# Patient Record
Sex: MALE | Race: WHITE | Employment: FULL TIME | URBAN - METROPOLITAN AREA
[De-identification: names, ages, dates, MRNs, and addresses within clinical notes are randomized per-mention and may not be internally consistent; named-entity substitution may affect disease eponyms.]

---

## 2017-10-23 ENCOUNTER — HOSPITAL ENCOUNTER (OUTPATIENT)
Dept: MRI IMAGING | Age: 72
Discharge: HOME | End: 2017-10-23
Attending: UROLOGY
Payer: MEDICARE

## 2017-10-23 VITALS — WEIGHT: 210 LBS | BODY MASS INDEX: 26.96 KG/M2

## 2017-10-23 DIAGNOSIS — R97.20 ELEVATED PROSTATE SPECIFIC ANTIGEN (PSA): ICD-10-CM

## 2017-10-23 LAB — CREAT BLD-MCNC: 0.8 MG/DL (ref 0.8–1.5)

## 2017-10-23 PROCEDURE — 72197 MRI PELVIS W/O & W/DYE: CPT

## 2017-10-23 PROCEDURE — A9577 INJ MULTIHANCE: HCPCS | Performed by: UROLOGY

## 2017-10-23 PROCEDURE — 74011250636 HC RX REV CODE- 250/636: Performed by: UROLOGY

## 2017-10-23 PROCEDURE — 74011000258 HC RX REV CODE- 258: Performed by: UROLOGY

## 2017-10-23 PROCEDURE — 82565 ASSAY OF CREATININE: CPT

## 2017-10-23 RX ORDER — SODIUM CHLORIDE 0.9 % (FLUSH) 0.9 %
10 SYRINGE (ML) INJECTION
Status: COMPLETED | OUTPATIENT
Start: 2017-10-23 | End: 2017-10-23

## 2017-10-23 RX ADMIN — GADOBENATE DIMEGLUMINE 10 ML: 529 INJECTION, SOLUTION INTRAVENOUS at 08:28

## 2017-10-23 RX ADMIN — SODIUM CHLORIDE 100 ML: 900 INJECTION, SOLUTION INTRAVENOUS at 08:28

## 2017-10-23 RX ADMIN — Medication 10 ML: at 08:28

## 2017-11-27 ENCOUNTER — HOSPITAL ENCOUNTER (OUTPATIENT)
Dept: LAB | Age: 72
Discharge: HOME | End: 2017-11-27

## 2017-11-27 PROCEDURE — 88305 TISSUE EXAM BY PATHOLOGIST: CPT | Performed by: UROLOGY

## 2018-06-12 PROBLEM — C61 PROSTATE CANCER (HCC): Status: ACTIVE | Noted: 2018-06-12

## 2018-07-23 ENCOUNTER — HOSPITAL ENCOUNTER (OUTPATIENT)
Dept: LAB | Age: 73
Discharge: HOME | End: 2018-07-23
Payer: MEDICARE

## 2018-07-23 ENCOUNTER — HOSPITAL ENCOUNTER (OUTPATIENT)
Dept: LAB | Age: 73
Discharge: HOME | End: 2018-07-23

## 2018-07-23 PROCEDURE — 88344 IMHCHEM/IMCYTCHM EA MLT ANTB: CPT

## 2018-07-23 PROCEDURE — 88305 TISSUE EXAM BY PATHOLOGIST: CPT | Performed by: UROLOGY

## 2019-02-27 ENCOUNTER — HOSPITAL ENCOUNTER (OUTPATIENT)
Dept: LAB | Age: 74
Discharge: HOME | End: 2019-02-27

## 2019-02-27 PROCEDURE — 88305 TISSUE EXAM BY PATHOLOGIST: CPT

## 2020-11-18 ENCOUNTER — HOSPITAL ENCOUNTER (OUTPATIENT)
Dept: LAB | Age: 75
Discharge: HOME | End: 2020-11-18

## 2020-11-18 PROCEDURE — 88305 TISSUE EXAM BY PATHOLOGIST: CPT

## 2022-03-20 PROBLEM — C61 PROSTATE CANCER (HCC): Status: ACTIVE | Noted: 2018-06-12

## 2024-01-02 ENCOUNTER — TELEPHONE (OUTPATIENT)
Dept: ORTHOPEDIC SURGERY | Age: 79
End: 2024-01-02

## 2024-01-02 ENCOUNTER — OFFICE VISIT (OUTPATIENT)
Dept: ORTHOPEDIC SURGERY | Age: 79
End: 2024-01-02
Payer: MEDICARE

## 2024-01-02 VITALS — BODY MASS INDEX: 27.34 KG/M2 | HEIGHT: 74 IN | WEIGHT: 213 LBS

## 2024-01-02 DIAGNOSIS — M17.11 PRIMARY OSTEOARTHRITIS OF RIGHT KNEE: Primary | ICD-10-CM

## 2024-01-02 DIAGNOSIS — M25.561 RIGHT KNEE PAIN, UNSPECIFIED CHRONICITY: Primary | ICD-10-CM

## 2024-01-02 DIAGNOSIS — M17.11 PRIMARY OSTEOARTHRITIS OF RIGHT KNEE: ICD-10-CM

## 2024-01-02 PROCEDURE — 99204 OFFICE O/P NEW MOD 45 MIN: CPT | Performed by: ORTHOPAEDIC SURGERY

## 2024-01-02 PROCEDURE — 1123F ACP DISCUSS/DSCN MKR DOCD: CPT | Performed by: ORTHOPAEDIC SURGERY

## 2024-01-02 NOTE — PROGRESS NOTES
Name: Chuy Strickland Jr.  YOB: 1945  Gender: male  MRN: [id number]    CC:   Chief Complaint   Patient presents with    New Patient    Knee Pain     Rt knee pain         HPI: Chuy Strickland Jr. is a 78 y.o. male with a PMHx of HTN and bradycardia  here for evaluation of right knee pain.  The pain has been present for 2 to 3 years and is becoming worse. The pain is primarily on the Medial side.   he describes the pain as dull, aching, and intermittent catching  The pain is worse with going up and/or down stairs, rising after sitting, standing, and walking  The pain does not radiate down the leg.  he denies numbness and tingling down the leg.   Treatment so far has been activity modification, Tylenol, and meloxicam with little relief.      Allergies   Allergen Reactions    Morphine Itching    Oxycodone Other (See Comments)     hallucinations         Review of Systems:  As per HPI.  Pertinent positives and negatives are addressed with the patient, particularly those related to musculoskeletal concerns.   Non-orthopaedic concerns were referred back to the primary care physician.    PHYSICAL EXAMINATION:   The patient appears their stated age and they are in no distress.  Ht 1.88 m (6' 2\")   Wt 96.6 kg (213 lb)   BMI 27.35 kg/m²       The lower extremities are as described below.  Circulation is normal with palpable pedal pulses bilaterally and no edema.  There is no lymph adenopathy in the popliteal or malleolar region.  The skin is without stasis disease distally bilaterally.  Sensation is intact to light touch bilaterally.  There is moderate tenderness to palpation over the medial joint line of the right knee(s)  The gait is noted to be with a slight trendelenburg and antalgia  Range of motion is 0-120 degrees on the right and 0-120 degrees on the left.  right knee: There is 2mm of anterior/posterior translation and 2mm of medial/lateral instability bilaterally.  There is 5 degrees of varus

## 2024-01-03 NOTE — PROGRESS NOTES
[facility]  [address] [address]  Dept: [phone]      Physical Therapy Initial Assessment       Insurance: today is 1/20 visits ()    Total Timed Codes: 35 min, Total Treatment Time: 65 min Modifier needed: No      Referring MD: Shaq Segovia Jr., *  Referral for: Right knee OA/pain  Onset Date: 1/4/2020      Diagnosis:     ICD-10-CM    1. Chronic pain of right knee  M25.561     G89.29       2. Joint stiffness of right lower leg  M25.661       3. Difficulty walking  R26.2       4. Muscle weakness  M62.81       5. Impaired mobility and ADLs  Z74.09     Z78.9              Therapy precautions:None  Co-morbidities affecting plan of care: Bradycardia, HX of prostate CA.      PERTINENT MEDICAL HISTORY     Past medical and surgical history:   Past Medical History:   Diagnosis Date    Bradycardia with 41-50 beats per minute     pt reports sometimes even <40 at rest- seen by cardiologist inpast    Elevated prostate specific antigen (PSA) 11/1/2013    Hypertrophy of prostate with urinary obstruction and other lower urinary tract symptoms (LUTS) 11/1/2013    Personal history of prostate cancer     Unspecified disorder of prostate       Past Surgical History:   Procedure Laterality Date    BIOPSY PROSTATE      ORTHOPEDIC SURGERY Right     scope/ meniscus tear    SHOULDER ARTHROSCOPY Right     TONSILLECTOMY AND ADENOIDECTOMY       Medications: reviewed in chart   Allergies:   Allergies   Allergen Reactions    Morphine Itching    Oxycodone Other (See Comments)     hallucinations          SUBJECTIVE     Chief complaints/history of injury: Patient presents to therapy with right knee pain due to OA. He has had chronic right knee pain for 4 years. He c/o buckling right knee. No falls reported. He has no report of injury right knee. He c/o moderate to severe pain right knee. He states right knee is stiff. He is not using assistive device at this time.         Received previous

## 2024-01-04 ENCOUNTER — TELEPHONE (OUTPATIENT)
Dept: ORTHOPEDIC SURGERY | Age: 79
End: 2024-01-04

## 2024-01-04 ENCOUNTER — EVALUATION (OUTPATIENT)
Age: 79
End: 2024-01-04

## 2024-01-04 DIAGNOSIS — M25.661 JOINT STIFFNESS OF RIGHT LOWER LEG: ICD-10-CM

## 2024-01-04 DIAGNOSIS — G89.29 CHRONIC PAIN OF RIGHT KNEE: Primary | ICD-10-CM

## 2024-01-04 DIAGNOSIS — M25.561 CHRONIC PAIN OF RIGHT KNEE: Primary | ICD-10-CM

## 2024-01-04 DIAGNOSIS — M62.81 MUSCLE WEAKNESS: ICD-10-CM

## 2024-01-04 DIAGNOSIS — Z74.09 IMPAIRED MOBILITY AND ADLS: ICD-10-CM

## 2024-01-04 DIAGNOSIS — Z78.9 IMPAIRED MOBILITY AND ADLS: ICD-10-CM

## 2024-01-04 DIAGNOSIS — R26.2 DIFFICULTY WALKING: ICD-10-CM

## 2024-01-10 DIAGNOSIS — M17.11 PRIMARY OSTEOARTHRITIS OF RIGHT KNEE: ICD-10-CM

## 2024-01-18 ENCOUNTER — PREP FOR PROCEDURE (OUTPATIENT)
Dept: ORTHOPEDIC SURGERY | Age: 79
End: 2024-01-18

## 2024-01-18 DIAGNOSIS — M17.11 PRIMARY OSTEOARTHRITIS OF RIGHT KNEE: Primary | ICD-10-CM

## 2024-01-18 RX ORDER — SODIUM CHLORIDE 0.9 % (FLUSH) 0.9 %
5-40 SYRINGE (ML) INJECTION PRN
Status: CANCELLED | OUTPATIENT
Start: 2024-01-18

## 2024-01-18 RX ORDER — SODIUM CHLORIDE 0.9 % (FLUSH) 0.9 %
5-40 SYRINGE (ML) INJECTION EVERY 12 HOURS SCHEDULED
Status: CANCELLED | OUTPATIENT
Start: 2024-01-18

## 2024-01-18 RX ORDER — SODIUM CHLORIDE 9 MG/ML
INJECTION, SOLUTION INTRAVENOUS PRN
Status: CANCELLED | OUTPATIENT
Start: 2024-01-18

## 2024-01-18 RX ORDER — ACETAMINOPHEN 325 MG/1
1000 TABLET ORAL ONCE
Status: CANCELLED | OUTPATIENT
Start: 2024-01-18 | End: 2024-01-18

## 2024-01-23 ENCOUNTER — HOSPITAL ENCOUNTER (OUTPATIENT)
Dept: REHABILITATION | Age: 79
Discharge: HOME | End: 2024-01-26
Payer: MEDICARE

## 2024-01-23 ENCOUNTER — HOSPITAL ENCOUNTER (OUTPATIENT)
Dept: SURGERY | Age: 79
Discharge: HOME | End: 2024-01-26
Payer: MEDICARE

## 2024-01-23 DIAGNOSIS — M17.11 PRIMARY OSTEOARTHRITIS OF RIGHT KNEE: ICD-10-CM

## 2024-01-23 LAB
ANION GAP SERPL CALC-SCNC: 2 MMOL/L (ref 2–11)
APTT PPP: 25.7 SEC (ref 23.3–37.4)
BASOPHILS # BLD: 0 K/UL (ref 0–0.2)
BASOPHILS NFR BLD: 1 % (ref 0–2)
BUN SERPL-MCNC: 17 MG/DL (ref 8–23)
CALCIUM SERPL-MCNC: 9 MG/DL (ref 8.3–10.4)
CHLORIDE SERPL-SCNC: 108 MMOL/L (ref 103–113)
CO2 SERPL-SCNC: 29 MMOL/L (ref 21–32)
CREAT SERPL-MCNC: 0.91 MG/DL (ref 0.8–1.5)
DIFFERENTIAL METHOD BLD: NORMAL
EKG ATRIAL RATE: 55 BPM
EKG DIAGNOSIS: NORMAL
EKG P AXIS: 40 DEGREES
EKG Q-T INTERVAL: 490 MS
EKG QRS DURATION: 92 MS
EKG QTC CALCULATION (BAZETT): 404 MS
EKG R AXIS: 49 DEGREES
EKG T AXIS: 24 DEGREES
EKG VENTRICULAR RATE: 41 BPM
EOSINOPHIL # BLD: 0.2 K/UL (ref 0–0.8)
EOSINOPHIL NFR BLD: 3 % (ref 0.5–7.8)
ERYTHROCYTE [DISTWIDTH] IN BLOOD BY AUTOMATED COUNT: 12.5 % (ref 11.9–14.6)
EST. AVERAGE GLUCOSE BLD GHB EST-MCNC: 114 MG/DL
GLUCOSE SERPL-MCNC: 126 MG/DL (ref 65–100)
HBA1C MFR BLD: 5.6 % (ref 4.8–5.6)
HCT VFR BLD AUTO: 44.1 % (ref 41.1–50.3)
HGB BLD-MCNC: 14.5 G/DL (ref 13.6–17.2)
IMM GRANULOCYTES # BLD AUTO: 0 K/UL (ref 0–0.5)
IMM GRANULOCYTES NFR BLD AUTO: 0 % (ref 0–5)
INR PPP: 1.1
LYMPHOCYTES # BLD: 1.3 K/UL (ref 0.5–4.6)
LYMPHOCYTES NFR BLD: 19 % (ref 13–44)
MCH RBC QN AUTO: 32.1 PG (ref 26.1–32.9)
MCHC RBC AUTO-ENTMCNC: 32.9 G/DL (ref 31.4–35)
MCV RBC AUTO: 97.6 FL (ref 82–102)
MONOCYTES # BLD: 0.5 K/UL (ref 0.1–1.3)
MONOCYTES NFR BLD: 7 % (ref 4–12)
MRSA DNA SPEC QL NAA+PROBE: NOT DETECTED
NEUTS SEG # BLD: 4.9 K/UL (ref 1.7–8.2)
NEUTS SEG NFR BLD: 70 % (ref 43–78)
NRBC # BLD: 0 K/UL (ref 0–0.2)
PLATELET # BLD AUTO: 177 K/UL (ref 150–450)
PMV BLD AUTO: 9.5 FL (ref 9.4–12.3)
POTASSIUM SERPL-SCNC: 3.9 MMOL/L (ref 3.5–5.1)
PROTHROMBIN TIME: 13.7 SEC (ref 11.3–14.9)
RBC # BLD AUTO: 4.52 M/UL (ref 4.23–5.6)
S AUREUS CPE NOSE QL NAA+PROBE: NOT DETECTED
SODIUM SERPL-SCNC: 139 MMOL/L (ref 136–146)
WBC # BLD AUTO: 7 K/UL (ref 4.3–11.1)

## 2024-01-23 PROCEDURE — 97161 PT EVAL LOW COMPLEX 20 MIN: CPT

## 2024-01-23 PROCEDURE — 85025 COMPLETE CBC W/AUTO DIFF WBC: CPT

## 2024-01-23 PROCEDURE — 83036 HEMOGLOBIN GLYCOSYLATED A1C: CPT

## 2024-01-23 PROCEDURE — 87641 MR-STAPH DNA AMP PROBE: CPT

## 2024-01-23 PROCEDURE — 93005 ELECTROCARDIOGRAM TRACING: CPT | Performed by: ANESTHESIOLOGY

## 2024-01-23 PROCEDURE — 94760 N-INVAS EAR/PLS OXIMETRY 1: CPT

## 2024-01-23 PROCEDURE — 85730 THROMBOPLASTIN TIME PARTIAL: CPT

## 2024-01-23 PROCEDURE — 93010 ELECTROCARDIOGRAM REPORT: CPT | Performed by: INTERNAL MEDICINE

## 2024-01-23 PROCEDURE — 80048 BASIC METABOLIC PNL TOTAL CA: CPT

## 2024-01-23 PROCEDURE — 85610 PROTHROMBIN TIME: CPT

## 2024-01-23 RX ORDER — MONTELUKAST SODIUM 10 MG/1
10 TABLET ORAL DAILY
COMMUNITY

## 2024-01-23 RX ORDER — OLMESARTAN MEDOXOMIL 5 MG/1
5 TABLET ORAL DAILY
COMMUNITY

## 2024-01-23 ASSESSMENT — KOOS JR
BENDING TO THE FLOOR TO PICK UP OBJECT: 2
TWISING OR PIVOTING ON KNEE: 1
RISING FROM SITTING: 2
HOW SEVERE IS YOUR KNEE STIFFNESS AFTER FIRST WAKING IN MORNING: 2
STRAIGHTENING KNEE FULLY: 2
KOOS JR TOTAL INTERVAL SCORE: 54.84
GOING UP OR DOWN STAIRS: 2
STANDING UPRIGHT: 2

## 2024-01-23 ASSESSMENT — PROMIS GLOBAL HEALTH SCALE
IN GENERAL, HOW WOULD YOU RATE YOUR SATISFACTION WITH YOUR SOCIAL ACTIVITIES AND RELATIONSHIPS [ON A SCALE OF 1 (POOR) TO 5 (EXCELLENT)]?: 5
IN GENERAL, PLEASE RATE HOW WELL YOU CARRY OUT YOUR USUAL SOCIAL ACTIVITIES (INCLUDES ACTIVITIES AT HOME, AT WORK, AND IN YOUR COMMUNITY, AND RESPONSIBILITIES AS A PARENT, CHILD, SPOUSE, EMPLOYEE, FRIEND, ETC) [ON A SCALE OF 1 (POOR) TO 5 (EXCELLENT)]?: 5
TO WHAT EXTENT ARE YOU ABLE TO CARRY OUT YOUR EVERYDAY PHYSICAL ACTIVITIES SUCH AS WALKING, CLIMBING STAIRS, CARRYING GROCERIES, OR MOVING A CHAIR [ON A SCALE OF 1 (NOT AT ALL) TO 5 (COMPLETELY)]?: 5
SUM OF RESPONSES TO QUESTIONS 2, 4, 5, & 10: 20
WHO IS THE PERSON COMPLETING THE PROMIS V1.1 SURVEY?: 0
IN GENERAL, HOW WOULD YOU RATE YOUR MENTAL HEALTH, INCLUDING YOUR MOOD AND YOUR ABILITY TO THINK [ON A SCALE OF 1 (POOR) TO 5 (EXCELLENT)]?: 5
SUM OF RESPONSES TO QUESTIONS 3, 6, 7, & 8: 15
HOW IS THE PROMIS V1.1 BEING ADMINISTERED?: 0
IN THE PAST 7 DAYS, HOW OFTEN HAVE YOU BEEN BOTHERED BY EMOTIONAL PROBLEMS, SUCH AS FEELING ANXIOUS, DEPRESSED, OR IRRITABLE [ON A SCALE FROM 1 (NEVER) TO 5 (ALWAYS)]?: 5
IN THE PAST 7 DAYS, HOW WOULD YOU RATE YOUR FATIGUE ON AVERAGE [ON A SCALE FROM 1 (NONE) TO 5 (VERY SEVERE)]?: 4
IN THE PAST 7 DAYS, HOW WOULD YOU RATE YOUR PAIN ON AVERAGE [ON A SCALE FROM 0 (NO PAIN) TO 10 (WORST IMAGINABLE PAIN)]?: 3
IN GENERAL, WOULD YOU SAY YOUR QUALITY OF LIFE IS...[ON A SCALE OF 1 (POOR) TO 5 (EXCELLENT)]: 5
IN GENERAL, HOW WOULD YOU RATE YOUR PHYSICAL HEALTH [ON A SCALE OF 1 (POOR) TO 5 (EXCELLENT)]?: 3
IN GENERAL, WOULD YOU SAY YOUR HEALTH IS...[ON A SCALE OF 1 (POOR) TO 5 (EXCELLENT)]: 3

## 2024-01-23 ASSESSMENT — PULMONARY FUNCTION TESTS
FEV1 (LITERS): 2.49
FEV1 (%PREDICTED): 74

## 2024-01-23 ASSESSMENT — PAIN SCALES - GENERAL: PAINLEVEL_OUTOF10: 2

## 2024-01-23 NOTE — PROGRESS NOTES
Guard Assistance,   Min=Minimal Assistance, Mod=Moderate Assistance, Max=Maximal Assistance, Total=Total Assistance, NT=Not Tested    TREATMENT:   EVALUATION: LOW COMPLEXITY: (Untimed Charge)    TREATMENT PLAN:   Effective Dates: 1/23/2024 TO 1/23/2024.    Treatment/Session Assessment:  Patient was instructed in PT- HEP to increase strength and ROM in LEs.  Answered all questions.  Frequency/Duration: Patient to continue to perform home exercise program at least twice per day up until his surgery.    EDUCATION: Education Given To: Patient  Education Provided: Role of Therapy, Plan of Care, Home Exercise Program  Education Method: Verbal, Video  Education Outcome: Verbalized understanding    MEDICAL NECESSITY: Mr. Strickland is expected to optimize hislower extremity strength and ROM in preparation for joint replacement surgery.    REASON FOR CONTINUED SERVICES: Achieve baseline assesment of musculoskeletal system, functional mobility and home environment., educate in PT HEP in preparation for surgery, educate in hospital plan of care.    COMPLIANCE WITH PROGRAM/EXERCISE: Will assess as treatment progresses.    TOTAL TREATMENT DURATION:  Time In: 1100  Time Out: 1115  Minutes: 15    Regarding Chuy Selbyalbania Perez's therapy, I certify that the treatment plan above will be carried out by a therapist or under their direction.  Thank you for this referral,  Nannette Saleh, PT

## 2024-01-23 NOTE — PERIOP NOTE
Patient verified name and .    Order for consent is found in EHR and matches case posting; patient verified.     Type 3 surgery, Joint camp assessment complete.    Labs per surgeon: CBC,BMP, PT/PTT, Hgba1c ; results (processing)  Labs per anesthesia protocol: no additional  EKG:done today - will have anesthesia review.     Pt with known 2nd degree heart block followed by [facility]. Denies CP, SOB, syncope.  Will have anesthesia review EKGs and card OFV.      MRSA/MSSA swab collected; pharmacy to review and dose antibiotic as appropriate.     Hospital approved surgical skin cleanser and instructions to return bottle on DOS given per hospital policy.    Patient provided with handouts including Guide to Surgery, Pain Management, Hand Hygiene, Blood Transfusion Education, and [facility] Brochure.    Patient answered medical/surgical history questions at their best of ability. All prior to admission medications documented in [facility]. Original medication prescription bottle not visualized during patient appointment.     Patient instructed to hold all vitamins 3 weeks prior to surgery and NSAIDS 5 days prior to surgery.     Patient teach back successful and patient demonstrates knowledge of instruction.

## 2024-01-23 NOTE — PERIOP NOTE
Labs done today within anesthesia protocols.      Latest Reference Range & Units 01/23/24 10:34   Sodium 136 - 146 mmol/L 139   Potassium 3.5 - 5.1 mmol/L 3.9   Chloride 103 - 113 mmol/L 108   CO2 21 - 32 mmol/L 29   BUN,BUNPL 8 - 23 MG/DL 17   Creatinine 0.8 - 1.5 MG/DL 0.91   Anion Gap 2 - 11 mmol/L 2   Est, Glom Filt Rate >60 ml/min/1.73m2 >60   Glucose, Random 65 - 100 mg/dL 126 (H)   CALCIUM, SERUM, 579688 8.3 - 10.4 MG/DL 9.0   Hemoglobin A1C 4.8 - 5.6 % 5.6   eAG (mg/dL) mg/dL 114   WBC 4.3 - 11.1 K/uL 7.0   RBC 4.23 - 5.6 M/uL 4.52   Hemoglobin Quant 13.6 - 17.2 g/dL 14.5   Hematocrit 41.1 - 50.3 % 44.1   MCV 82.0 - 102.0 FL 97.6   MCH 26.1 - 32.9 PG 32.1   MCHC 31.4 - 35.0 g/dL 32.9   MPV 9.4 - 12.3 FL 9.5   RDW 11.9 - 14.6 % 12.5   Platelet Count 150 - 450 K/uL 177   Neutrophils % 43 - 78 % 70   Lymphocyte % 13 - 44 % 19   Monocytes % 4.0 - 12.0 % 7   Eosinophils % 0.5 - 7.8 % 3   Basophils % 0.0 - 2.0 % 1   Neutrophils Absolute 1.7 - 8.2 K/UL 4.9   Lymphocytes Absolute 0.5 - 4.6 K/UL 1.3   Monocytes Absolute 0.1 - 1.3 K/UL 0.5   Eosinophils Absolute 0.0 - 0.8 K/UL 0.2   Basophils Absolute 0.0 - 0.2 K/UL 0.0   Differential Type -   AUTOMATED   Immature Granulocytes 0.0 - 5.0 % 0   Nucleated Red Blood Cells 0.0 - 0.2 K/uL 0.00   Absolute Immature Granulocyte 0.0 - 0.5 K/UL 0.0   Prothrombin Time 11.3 - 14.9 sec 13.7   INR -   1.1   PTT 23.3 - 37.4 SEC 25.7   MRSA/STAPH AUREUS DNA  Rpt   (H): Data is abnormally high  Rpt: View report in Results Review for more information

## 2024-01-23 NOTE — PERIOP NOTE
PLEASE CONTINUE TAKING ALL PRESCRIPTION MEDICATIONS UP TO THE DAY OF SURGERY UNLESS OTHERWISE DIRECTED BELOW. You may take Tylenol, allergy,  and/or indigestion medications.     TAKE ONLY THESE MEDICATIONS ON THE DAY OF SURGERY      Aspirin 81 mg     Singulair        DISCONTINUE all vitamins and supplements 21 days prior to surgery. DISCONTINUE Non-Steroidal Anti-Inflammatory (NSAIDS) such as Advil and Aleve 5 days prior to surgery.     Home Medications to Hold- please continue all other medications except these.            Comments      On the day before surgery please take 2 Tylenol in the morning and then again before bed. You may use either regular or extra strength.           Please do not bring home medications with you on the day of surgery unless otherwise directed by your nurse.  If you are instructed to bring home medications, please give them to your nurse as they will be administered by the nursing staff.    If you have any questions, please call [facility] [phone].    A copy of this note was provided to the patient for reference.

## 2024-01-24 VITALS
BODY MASS INDEX: 27.89 KG/M2 | HEIGHT: 74 IN | DIASTOLIC BLOOD PRESSURE: 73 MMHG | TEMPERATURE: 97.7 F | SYSTOLIC BLOOD PRESSURE: 175 MMHG | OXYGEN SATURATION: 97 % | WEIGHT: 217.3 LBS | RESPIRATION RATE: 16 BRPM | HEART RATE: 45 BPM

## 2024-01-24 NOTE — PROGRESS NOTES
Addended by: MERLY DELGADO on: 9/7/2021 09:33 AM     Modules accepted: Orders                       CS HS  RESPIRATORY ASSESSMENT Q SHIFT   O2 PRN    ALBUTEROL  NEBULIZER Q6 PRN WHEEZING                                        Referrals:    Pt. Phone Number:

## 2024-01-24 NOTE — PROGRESS NOTES
Taking? Authorizing Provider   montelukast (SINGULAIR) 10 MG tablet Take 1 tablet by mouth daily   Yes Eliane Mc MD   olmesartan (BENICAR) 5 MG tablet Take 1 tablet by mouth daily   Yes ProviderEliane MD   Omega-3 Fatty Acids (FISH OIL PO) Take by mouth daily   Yes Eliane Mc MD   Naproxen Sodium (ALEVE PO) Take by mouth daily   Yes Eliane Mc MD   aspirin 81 MG chewable tablet Take 1 tablet by mouth    Automatic Reconciliation, Ar   ibuprofen (ADVIL;MOTRIN) 200 MG tablet Take 1 tablet by mouth as needed    Automatic Reconciliation, Ar   tadalafil (CIALIS) 5 MG tablet Take 5 mg by mouth daily 9/27/19   Automatic Reconciliation, Ar     Allergies   Allergen Reactions    Morphine Itching    Oxycodone Other (See Comments)     hallucinations    Aluminum-Containing Compounds Rash          Objective:     Physical Exam:   Patient Vitals for the past 24 hrs:   Temp Pulse Resp BP SpO2   01/23/24 1053 97.7 °F (36.5 °C) 52 16 (!) 175/73 96 %       ECG:    Encounter Date: 01/23/24   EKG 12 Lead   Result Value    Ventricular Rate 41    Atrial Rate 55    QRS Duration 92    Q-T Interval 490    QTc Calculation (Bazett) 404    P Axis 40    R Axis 49    T Axis 24    Diagnosis      Sinus bradycardia with Mobitz I (Wenckebach) block  Abnormal ECG  When compared with ECG of 28-NOV-2007 08:54,  Sinus rhythm is now with 2nd degree A-V block (Mobitz I)  Confirmed by MD CATHERINE DANIEL ([id number]) on 1/23/2024 12:34:44 PM         Data Review:   Labs:   Recent Results (from the past 24 hour(s))   EKG 12 Lead    Collection Time: 01/23/24 10:27 AM   Result Value Ref Range    Ventricular Rate 41 BPM    Atrial Rate 55 BPM    QRS Duration 92 ms    Q-T Interval 490 ms    QTc Calculation (Bazett) 404 ms    P Axis 40 degrees    R Axis 49 degrees    T Axis 24 degrees    Diagnosis       Sinus bradycardia with Mobitz I (Wenckebach) block  Abnormal ECG  When compared with ECG of 28-NOV-2007 08:54,  Sinus rhythm is now

## 2024-02-12 ENCOUNTER — OFFICE VISIT (OUTPATIENT)
Dept: ORTHOPEDIC SURGERY | Age: 79
End: 2024-02-12
Payer: MEDICARE

## 2024-02-12 DIAGNOSIS — M43.16 SPONDYLOLISTHESIS OF LUMBAR REGION: ICD-10-CM

## 2024-02-12 DIAGNOSIS — Z01.818 ENCOUNTER FOR PRE-OPERATIVE EXAMINATION: ICD-10-CM

## 2024-02-12 DIAGNOSIS — M47.816 LUMBAR SPONDYLOSIS: ICD-10-CM

## 2024-02-12 DIAGNOSIS — M25.552 LEFT HIP PAIN: ICD-10-CM

## 2024-02-12 DIAGNOSIS — M51.36 DDD (DEGENERATIVE DISC DISEASE), LUMBAR: ICD-10-CM

## 2024-02-12 DIAGNOSIS — M47.816 FACET ARTHROPATHY, LUMBAR: ICD-10-CM

## 2024-02-12 DIAGNOSIS — M54.50 LOW BACK PAIN, UNSPECIFIED BACK PAIN LATERALITY, UNSPECIFIED CHRONICITY, UNSPECIFIED WHETHER SCIATICA PRESENT: ICD-10-CM

## 2024-02-12 DIAGNOSIS — M17.11 PRIMARY OSTEOARTHRITIS OF RIGHT KNEE: Primary | ICD-10-CM

## 2024-02-12 PROCEDURE — 99214 OFFICE O/P EST MOD 30 MIN: CPT | Performed by: PHYSICIAN ASSISTANT

## 2024-02-12 PROCEDURE — 1036F TOBACCO NON-USER: CPT | Performed by: PHYSICIAN ASSISTANT

## 2024-02-12 PROCEDURE — G8427 DOCREV CUR MEDS BY ELIG CLIN: HCPCS | Performed by: PHYSICIAN ASSISTANT

## 2024-02-12 PROCEDURE — G8419 CALC BMI OUT NRM PARAM NOF/U: HCPCS | Performed by: PHYSICIAN ASSISTANT

## 2024-02-12 PROCEDURE — 1123F ACP DISCUSS/DSCN MKR DOCD: CPT | Performed by: PHYSICIAN ASSISTANT

## 2024-02-12 PROCEDURE — G8484 FLU IMMUNIZE NO ADMIN: HCPCS | Performed by: PHYSICIAN ASSISTANT

## 2024-02-12 RX ORDER — DOCUSATE SODIUM 100 MG/1
100 CAPSULE, LIQUID FILLED ORAL 2 TIMES DAILY
Qty: 60 CAPSULE | Refills: 0 | Status: SHIPPED | OUTPATIENT
Start: 2024-02-12 | End: 2024-03-13

## 2024-02-12 RX ORDER — PROMETHAZINE HYDROCHLORIDE 12.5 MG/1
12.5 TABLET ORAL 4 TIMES DAILY PRN
Qty: 20 TABLET | Refills: 2 | Status: SHIPPED | OUTPATIENT
Start: 2024-02-12

## 2024-02-12 RX ORDER — CELECOXIB 200 MG/1
200 CAPSULE ORAL 2 TIMES DAILY
Qty: 60 CAPSULE | Refills: 0 | Status: SHIPPED | OUTPATIENT
Start: 2024-02-12 | End: 2024-03-13

## 2024-02-12 RX ORDER — OXYCODONE HYDROCHLORIDE 5 MG/1
5-10 TABLET ORAL
Qty: 60 TABLET | Refills: 0 | Status: SHIPPED | OUTPATIENT
Start: 2024-02-12 | End: 2024-02-17

## 2024-02-12 RX ORDER — TIZANIDINE 2 MG/1
2 TABLET ORAL EVERY 6 HOURS PRN
Qty: 30 TABLET | Refills: 0 | Status: SHIPPED | OUTPATIENT
Start: 2024-02-12

## 2024-02-12 RX ORDER — ASPIRIN 81 MG/1
81 TABLET ORAL 2 TIMES DAILY
Qty: 70 TABLET | Refills: 0 | Status: SHIPPED | OUTPATIENT
Start: 2024-02-12 | End: 2024-03-18

## 2024-02-12 NOTE — PROGRESS NOTES
[facility]  Pre Operative History and Physical Exam    Patient ID:  Chuy Strickland Jr.  [id number]  78 y.o.  1945    Today: February 12, 2024           CC: Right knee pain    HPI:   The patient has end stage arthritis of the right knee. The patient was evaluated and examined during a consultation prior to this office visit.  There have been no changes to the patient's orthopedic condition since the initial consultation. The patient has failed previous conservative treatment for this condition including antiinflammatories , and lifestyle modifications. The necessity for joint replacement is present. The patient will be admitted the day of surgery for right knee replacement    Past Medical/Surgical History:  Past Medical History:   Diagnosis Date    Anesthesia 04/20/2023    ER visit after last shoulder surgery - unable to urinate    Bradycardia with 41-50 beats per minute     pt reports sometimes even <40 at rest- followed by Dr. Salas @ [facility]    Elevated prostate specific antigen (PSA) 11/1/2013    Hemochromatosis     History of second degree heart block     followed by [facility]. Asymptomatic.    HTN (hypertension)     Hypertrophy of prostate with urinary obstruction and other lower urinary tract symptoms (LUTS) 11/1/2013    Personal history of prostate cancer     Unspecified disorder of prostate     Wenckebach second degree AV block      Past Surgical History:   Procedure Laterality Date    BIOPSY PROSTATE      X 4    COLONOSCOPY W/ POLYPECTOMY      ELBOW FRACTURE SURGERY Right 09/19/2016    ORIF    HERNIA REPAIR Left     KNEE ARTHROSCOPY Right 2007    scope/ meniscus tear    REFRACTIVE SURGERY      SHOULDER ARTHROSCOPY Right     SHOULDER ARTHROSCOPY Left 04/2023    TONSILLECTOMY AND ADENOIDECTOMY          Allergies:   Allergies   Allergen Reactions    Morphine Itching    Oxycodone Other (See Comments)     hallucinations    Aluminum-Containing Compounds Rash

## 2024-02-12 NOTE — H&P (VIEW-ONLY)
Name: Chuy Strickland Jr.  YOB: 1945  Gender: male  MRN: [id number]    CC:   Chief Complaint   Patient presents with    Pre-op Exam     Right knee         HPI: Chuy Strickland Jr. is a 78 y.o. male with a PMHx of HTN here for evaluation of lower back pain after a fall onto the left side while [place] the beach recently  The pain is primarily  in the left lower back  he describes the pain as dull and aching  The pain is worse with pivoting, twisting, and walking  The pain does not radiate down the leg.  he denies numbness and tingling down the leg.   Treatment so far has been activity modification, Tylenol, and Aleve  with some relief.        Review of Systems  As per HPI.  Pertinent positives and negatives are addressed with the patient, particularly those related to musculoskeletal concerns.  Non-orthopaedic concerns were referred back to the primary care physician.      Allergies   Allergen Reactions    Morphine Itching    Oxycodone Other (See Comments)     hallucinations    Aluminum-Containing Compounds Rash                    PHYSICAL EXAMINATION:   The patient is alert and oriented, in no distress.  There were no vitals filed for this visit.       The cervical, thoracic and lumbar spine are without compromising deformity. The upper extremities demonstrate reasonable tone, strength and function bilaterally.  The lower extremities are as described below.  Sensation is intact to light touch bilaterally.  The gait is noted to be normal  There is mild tenderness to palpation along the spinous processes and paraspinal musculature on the left.   There no tenderness to palpation over the left greater trochanter  Limb lengths are equal.  Straight leg test is Positive left  Stability is normal bilaterally.  Their judgment and insight are normal.  They are oriented to time, place and person.  Their memory is good and the mood and affect appropriate.           XRAYS:     AP, lateral, and focused lateral

## 2024-02-12 NOTE — PATIENT INSTRUCTIONS
Patient Education        Low Back Arthritis: Exercises  Introduction  Here are some examples of typical rehabilitation exercises for your condition. Start each exercise slowly. Ease off the exercise if you start to have pain.  Your doctor or physical therapist will tell you when you can start these exercises and which ones will work best for you.  When you are not being active, find a comfortable position for rest. Some people are comfortable on the floor or a medium-firm bed with a small pillow under their head and another under their knees. Some people prefer to lie on their side with a pillow between their knees. Don't stay in one position for too long.  Take short walks (10 to 20 minutes) every 2 to 3 hours. Avoid slopes, hills, and stairs until you feel better. Walk only distances you can manage without pain, especially leg pain.  How to do the exercises  Pelvic tilt    Lie on your back with your knees bent.  \"Brace\" your stomach--tighten your muscles by pulling in and imagining your belly button moving toward your spine.  Press your lower back into the floor. You should feel your hips and pelvis rock back.  Hold for 6 seconds while breathing smoothly.  Relax and allow your pelvis and hips to rock forward.  Repeat 8 to 12 times.  Back stretches    Get down on your hands and knees on the floor.  Relax your head and allow it to droop. Round your back up toward the ceiling until you feel a nice stretch in your upper, middle, and lower back. Hold this stretch for as long as it feels comfortable, or about 15 to 30 seconds.  Return to the starting position with a flat back while you are on your hands and knees.  Let your back sway by pressing your stomach toward the floor. Lift your buttocks toward the ceiling.  Hold this position for 15 to 30 seconds.  Repeat 2 to 4 times.  Follow-up care is a key part of your treatment and safety. Be sure to make and go to all appointments, and call your doctor if you are having

## 2024-02-12 NOTE — PROGRESS NOTES
Name: Chuy Strickland Jr.  YOB: 1945  Gender: male  MRN: [id number]    CC:   Chief Complaint   Patient presents with    Pre-op Exam     Right knee         HPI: Chuy Strickland Jr. is a 78 y.o. male with a PMHx of HTN here for evaluation of lower back pain after a fall onto the left side while [place] the beach recently  The pain is primarily  in the left lower back  he describes the pain as dull and aching  The pain is worse with pivoting, twisting, and walking  The pain does not radiate down the leg.  he denies numbness and tingling down the leg.   Treatment so far has been activity modification, Tylenol, and Aleve  with some relief.        Review of Systems  As per HPI.  Pertinent positives and negatives are addressed with the patient, particularly those related to musculoskeletal concerns.  Non-orthopaedic concerns were referred back to the primary care physician.      Allergies   Allergen Reactions    Morphine Itching    Oxycodone Other (See Comments)     hallucinations    Aluminum-Containing Compounds Rash                    PHYSICAL EXAMINATION:   The patient is alert and oriented, in no distress.  There were no vitals filed for this visit.       The cervical, thoracic and lumbar spine are without compromising deformity. The upper extremities demonstrate reasonable tone, strength and function bilaterally.  The lower extremities are as described below.  Sensation is intact to light touch bilaterally.  The gait is noted to be normal  There is mild tenderness to palpation along the spinous processes and paraspinal musculature on the left.   There no tenderness to palpation over the left greater trochanter  Limb lengths are equal.  Straight leg test is Positive left  Stability is normal bilaterally.  Their judgment and insight are normal.  They are oriented to time, place and person.  Their memory is good and the mood and affect appropriate.           XRAYS:     AP, lateral, and focused lateral

## 2024-02-13 ENCOUNTER — ANESTHESIA EVENT (OUTPATIENT)
Dept: SURGERY | Age: 79
End: 2024-02-13
Payer: MEDICARE

## 2024-02-13 NOTE — DISCHARGE INSTRUCTIONS
care of this.     If you did not get instructions, follow this general advice:  If you have strips of tape on the cut the doctor made, leave the tape on for a week or until it falls off.  If you have stitches or staples, your doctor will tell you when to come back to have them removed.  If you have skin glue on the cut, leave it on until it falls off. Skin glue is also called skin adhesive or liquid stitches.  Change the bandage every day.  Wash the area daily with warm water, and pat it dry. Don't use hydrogen peroxide or alcohol. They can slow healing.  You may cover the area with a gauze bandage if it oozes fluid or rubs against clothing.  You may shower 24 to 48 hours after surgery. Pat the incision dry. Don't swim or take a bath for the first 2 weeks, or until your doctor tells you it is okay.   Exercise    Your rehab program will give you a number of exercises to do to help you get back your knee's range of motion and strength. Always do them as your therapist tells you.   Ice    For pain and swelling, put ice or a cold pack on the area for 10 to 20 minutes at a time. Put a thin cloth between the ice and your skin. If your doctor recommended cold therapy using a portable machine, follow the instructions that came with the machine.   Other instructions    Wear compression stockings if your doctor told you to. These may help to prevent blood clots. Your doctor will tell you how long you need to keep wearing the compression stockings.     Carry a medical alert card that says you have an artificial joint. You have metal pieces in your knee. These may set off some airport metal detectors.   Follow-up care is a key part of your treatment and safety. Be sure to make and go to all appointments, and call your doctor if you are having problems. It's also a good idea to know your test results and keep a list of the medicines you take.  When should you call for help?   Call 911 anytime you think you may need emergency

## 2024-02-13 NOTE — ANESTHESIA PRE PROCEDURE
Department of Anesthesiology  Preprocedure Note       Name:  Chuy Strickland Jr.   Age:  78 y.o.  :  1945                                          MRN:  [id number]         Date:  2024      Surgeon: Surgeon(s):  Shaq Segovia Jr., MD    Procedure: Procedure(s):  rt KNEE TOTAL ARTHROPLASTY ROBOTIC/ SDD    Medications prior to admission:   Prior to Admission medications    Medication Sig Start Date End Date Taking? Authorizing Provider   aspirin (ASPIRIN 81) 81 MG EC tablet Take 1 tablet by mouth in the morning and at bedtime 2/12/24 3/18/24  Kimmy Luna PA   celecoxib (CELEBREX) 200 MG capsule Take 1 capsule by mouth 2 times daily 2/12/24 3/13/24  Kimmy Luna PA   oxyCODONE (ROXICODONE) 5 MG immediate release tablet Take 1-2 tablets by mouth every 4-6 hours as needed for Pain for up to 5 days. Intended supply: 5 days. Take lowest dose possible to manage pain Max Daily Amount: 60 mg 24  Kimmy Luna PA   promethazine (PHENERGAN) 12.5 MG tablet Take 1 tablet by mouth 4 times daily as needed for Nausea 24   Kimmy Luna PA   tiZANidine (ZANAFLEX) 2 MG tablet Take 1 tablet by mouth every 6 hours as needed (muscle spasm) 24   Kimmy Luna PA   docusate sodium (COLACE) 100 MG capsule Take 1 capsule by mouth 2 times daily 2/12/24 3/13/24  Kimmy Luna PA   montelukast (SINGULAIR) 10 MG tablet Take 1 tablet by mouth daily    ProviderEliane MD   olmesartan (BENICAR) 5 MG tablet Take 1 tablet by mouth daily    Eliane Mc MD   Omega-3 Fatty Acids (FISH OIL PO) Take by mouth daily    Eliane Mc MD   Naproxen Sodium (ALEVE PO) Take by mouth daily    Eliane Mc MD   ibuprofen (ADVIL;MOTRIN) 200 MG tablet Take 1 tablet by mouth as needed    Automatic Reconciliation, Ar   tadalafil (CIALIS) 5 MG tablet Take 5 mg by mouth daily 19   Automatic Reconciliation, Ar       Current medications:    No current

## 2024-02-14 ENCOUNTER — ANESTHESIA (OUTPATIENT)
Dept: SURGERY | Age: 79
End: 2024-02-14
Payer: MEDICARE

## 2024-02-14 ENCOUNTER — HOSPITAL ENCOUNTER (OUTPATIENT)
Age: 79
LOS: 1 days | Discharge: HOME | End: 2024-02-15
Attending: ORTHOPAEDIC SURGERY | Admitting: ORTHOPAEDIC SURGERY
Payer: MEDICARE

## 2024-02-14 PROBLEM — Z96.651 STATUS POST TOTAL RIGHT KNEE REPLACEMENT: Status: ACTIVE | Noted: 2024-02-14

## 2024-02-14 PROCEDURE — 3600000005 HC SURGERY LEVEL 5 BASE: Performed by: ORTHOPAEDIC SURGERY

## 2024-02-14 PROCEDURE — 7100000001 HC PACU RECOVERY - ADDTL 15 MIN: Performed by: ORTHOPAEDIC SURGERY

## 2024-02-14 PROCEDURE — 3600000015 HC SURGERY LEVEL 5 ADDTL 15MIN: Performed by: ORTHOPAEDIC SURGERY

## 2024-02-14 PROCEDURE — 94761 N-INVAS EAR/PLS OXIMETRY MLT: CPT

## 2024-02-14 PROCEDURE — 2580000003 HC RX 258: Performed by: ANESTHESIOLOGY

## 2024-02-14 PROCEDURE — 97535 SELF CARE MNGMENT TRAINING: CPT

## 2024-02-14 PROCEDURE — 2709999900 HC NON-CHARGEABLE SUPPLY: Performed by: ORTHOPAEDIC SURGERY

## 2024-02-14 PROCEDURE — 6360000002 HC RX W HCPCS: Performed by: ORTHOPAEDIC SURGERY

## 2024-02-14 PROCEDURE — 2500000003 HC RX 250 WO HCPCS: Performed by: NURSE ANESTHETIST, CERTIFIED REGISTERED

## 2024-02-14 PROCEDURE — 3700000000 HC ANESTHESIA ATTENDED CARE: Performed by: ORTHOPAEDIC SURGERY

## 2024-02-14 PROCEDURE — 64447 NJX AA&/STRD FEMORAL NRV IMG: CPT | Performed by: ANESTHESIOLOGY

## 2024-02-14 PROCEDURE — 6370000000 HC RX 637 (ALT 250 FOR IP): Performed by: PHYSICIAN ASSISTANT

## 2024-02-14 PROCEDURE — 6360000002 HC RX W HCPCS: Performed by: NURSE ANESTHETIST, CERTIFIED REGISTERED

## 2024-02-14 PROCEDURE — 97161 PT EVAL LOW COMPLEX 20 MIN: CPT

## 2024-02-14 PROCEDURE — 6370000000 HC RX 637 (ALT 250 FOR IP): Performed by: ORTHOPAEDIC SURGERY

## 2024-02-14 PROCEDURE — C1776 JOINT DEVICE (IMPLANTABLE): HCPCS | Performed by: ORTHOPAEDIC SURGERY

## 2024-02-14 PROCEDURE — 2720000010 HC SURG SUPPLY STERILE: Performed by: ORTHOPAEDIC SURGERY

## 2024-02-14 PROCEDURE — 3700000001 HC ADD 15 MINUTES (ANESTHESIA): Performed by: ORTHOPAEDIC SURGERY

## 2024-02-14 PROCEDURE — 6360000002 HC RX W HCPCS: Performed by: PHYSICIAN ASSISTANT

## 2024-02-14 PROCEDURE — 97165 OT EVAL LOW COMPLEX 30 MIN: CPT

## 2024-02-14 PROCEDURE — 2580000003 HC RX 258: Performed by: PHYSICIAN ASSISTANT

## 2024-02-14 PROCEDURE — 6360000002 HC RX W HCPCS: Performed by: ANESTHESIOLOGY

## 2024-02-14 PROCEDURE — 94760 N-INVAS EAR/PLS OXIMETRY 1: CPT

## 2024-02-14 PROCEDURE — C1713 ANCHOR/SCREW BN/BN,TIS/BN: HCPCS | Performed by: ORTHOPAEDIC SURGERY

## 2024-02-14 PROCEDURE — 97530 THERAPEUTIC ACTIVITIES: CPT

## 2024-02-14 PROCEDURE — 7100000000 HC PACU RECOVERY - FIRST 15 MIN: Performed by: ORTHOPAEDIC SURGERY

## 2024-02-14 DEVICE — PALACOS® R IS A FAST-CURING, RADIOPAQUE, POLY(METHYL METHACRYLATE)-BASED BONE CEMENT.PALACOS ® R CONTAINS THE X-RAY CONTRAST MEDIUM ZIRCONIUM DIOXIDE. TO IMPROVE VISIBILITY IN THE SURGICAL FIELD PALACOS ® R HAS BEEN COLOURED WITH CHLOROPHYLL (E141). THE BONE CEMENT IS PREPARED DIRECTLY BEFORE USE BY MIXING A POLYMER POWDER COMPONENT WITH A LIQUID MONOMER COMPONENT. A DUCTILE DOUGH FORMS WHICH CURES WITHIN A FEW MINUTES.
Type: IMPLANTABLE DEVICE | Site: KNEE | Status: FUNCTIONAL
Brand: PALACOS®

## 2024-02-14 DEVICE — IMPLANTABLE DEVICE
Type: IMPLANTABLE DEVICE | Site: KNEE | Status: FUNCTIONAL
Brand: TRIATHLON

## 2024-02-14 DEVICE — TIBIAL COMPONENT
Type: IMPLANTABLE DEVICE | Site: KNEE | Status: FUNCTIONAL
Brand: TRIATHLON

## 2024-02-14 DEVICE — CRUCIATE RETAINING FEMORAL
Type: IMPLANTABLE DEVICE | Site: KNEE | Status: FUNCTIONAL
Brand: TRIATHLON

## 2024-02-14 DEVICE — TIBIAL BEARING INSERT - CS
Type: IMPLANTABLE DEVICE | Site: KNEE | Status: FUNCTIONAL
Brand: TRIATHLON

## 2024-02-14 DEVICE — COMPONENT TOT KNEE CAPPED PRIMARY CEM X3 TRIATHLON: Type: IMPLANTABLE DEVICE | Status: FUNCTIONAL

## 2024-02-14 RX ORDER — ASPIRIN 81 MG/1
81 TABLET ORAL 2 TIMES DAILY
Status: DISCONTINUED | OUTPATIENT
Start: 2024-02-14 | End: 2024-02-15 | Stop reason: HOSPADM

## 2024-02-14 RX ORDER — LIDOCAINE HYDROCHLORIDE 10 MG/ML
1 INJECTION, SOLUTION INFILTRATION; PERINEURAL
Status: DISCONTINUED | OUTPATIENT
Start: 2024-02-14 | End: 2024-02-14 | Stop reason: HOSPADM

## 2024-02-14 RX ORDER — SODIUM CHLORIDE 0.9 % (FLUSH) 0.9 %
5-40 SYRINGE (ML) INJECTION EVERY 12 HOURS SCHEDULED
Status: DISCONTINUED | OUTPATIENT
Start: 2024-02-14 | End: 2024-02-15 | Stop reason: HOSPADM

## 2024-02-14 RX ORDER — SODIUM CHLORIDE, SODIUM LACTATE, POTASSIUM CHLORIDE, CALCIUM CHLORIDE 600; 310; 30; 20 MG/100ML; MG/100ML; MG/100ML; MG/100ML
INJECTION, SOLUTION INTRAVENOUS CONTINUOUS
Status: DISCONTINUED | OUTPATIENT
Start: 2024-02-14 | End: 2024-02-14 | Stop reason: HOSPADM

## 2024-02-14 RX ORDER — DEXAMETHASONE SODIUM PHOSPHATE 10 MG/ML
INJECTION, SOLUTION INTRAMUSCULAR; INTRAVENOUS
Status: DISCONTINUED | OUTPATIENT
Start: 2024-02-14 | End: 2024-02-14 | Stop reason: SDUPTHER

## 2024-02-14 RX ORDER — ACETAMINOPHEN 500 MG
1000 TABLET ORAL EVERY 6 HOURS
Status: DISCONTINUED | OUTPATIENT
Start: 2024-02-14 | End: 2024-02-15 | Stop reason: HOSPADM

## 2024-02-14 RX ORDER — ONDANSETRON 2 MG/ML
4 INJECTION INTRAMUSCULAR; INTRAVENOUS EVERY 6 HOURS PRN
Status: DISCONTINUED | OUTPATIENT
Start: 2024-02-14 | End: 2024-02-15 | Stop reason: HOSPADM

## 2024-02-14 RX ORDER — SODIUM CHLORIDE 0.9 % (FLUSH) 0.9 %
5-40 SYRINGE (ML) INJECTION EVERY 12 HOURS SCHEDULED
Status: DISCONTINUED | OUTPATIENT
Start: 2024-02-14 | End: 2024-02-14 | Stop reason: HOSPADM

## 2024-02-14 RX ORDER — TRANEXAMIC ACID 100 MG/ML
INJECTION, SOLUTION INTRAVENOUS PRN
Status: DISCONTINUED | OUTPATIENT
Start: 2024-02-14 | End: 2024-02-14 | Stop reason: SDUPTHER

## 2024-02-14 RX ORDER — FENTANYL CITRATE 50 UG/ML
25 INJECTION, SOLUTION INTRAMUSCULAR; INTRAVENOUS
Status: COMPLETED | OUTPATIENT
Start: 2024-02-14 | End: 2024-02-14

## 2024-02-14 RX ORDER — HYDROMORPHONE HYDROCHLORIDE 1 MG/ML
0.25 INJECTION, SOLUTION INTRAMUSCULAR; INTRAVENOUS; SUBCUTANEOUS EVERY 5 MIN PRN
Status: DISCONTINUED | OUTPATIENT
Start: 2024-02-14 | End: 2024-02-14 | Stop reason: HOSPADM

## 2024-02-14 RX ORDER — ONDANSETRON 2 MG/ML
4 INJECTION INTRAMUSCULAR; INTRAVENOUS
Status: DISCONTINUED | OUTPATIENT
Start: 2024-02-14 | End: 2024-02-14 | Stop reason: HOSPADM

## 2024-02-14 RX ORDER — SODIUM CHLORIDE 0.9 % (FLUSH) 0.9 %
5-40 SYRINGE (ML) INJECTION PRN
Status: DISCONTINUED | OUTPATIENT
Start: 2024-02-14 | End: 2024-02-14 | Stop reason: HOSPADM

## 2024-02-14 RX ORDER — DIPHENHYDRAMINE HYDROCHLORIDE 50 MG/ML
25 INJECTION INTRAMUSCULAR; INTRAVENOUS EVERY 6 HOURS PRN
Status: DISCONTINUED | OUTPATIENT
Start: 2024-02-14 | End: 2024-02-15 | Stop reason: HOSPADM

## 2024-02-14 RX ORDER — SODIUM CHLORIDE 0.9 % (FLUSH) 0.9 %
5-40 SYRINGE (ML) INJECTION PRN
Status: DISCONTINUED | OUTPATIENT
Start: 2024-02-14 | End: 2024-02-15 | Stop reason: HOSPADM

## 2024-02-14 RX ORDER — ALBUTEROL SULFATE 2.5 MG/3ML
2.5 SOLUTION RESPIRATORY (INHALATION) EVERY 6 HOURS PRN
Status: DISCONTINUED | OUTPATIENT
Start: 2024-02-14 | End: 2024-02-15 | Stop reason: HOSPADM

## 2024-02-14 RX ORDER — KETOROLAC TROMETHAMINE 30 MG/ML
INJECTION, SOLUTION INTRAMUSCULAR; INTRAVENOUS PRN
Status: DISCONTINUED | OUTPATIENT
Start: 2024-02-14 | End: 2024-02-14 | Stop reason: ALTCHOICE

## 2024-02-14 RX ORDER — ONDANSETRON 2 MG/ML
INJECTION INTRAMUSCULAR; INTRAVENOUS PRN
Status: DISCONTINUED | OUTPATIENT
Start: 2024-02-14 | End: 2024-02-14 | Stop reason: SDUPTHER

## 2024-02-14 RX ORDER — DIPHENHYDRAMINE HYDROCHLORIDE 50 MG/ML
6.25 INJECTION INTRAMUSCULAR; INTRAVENOUS
Status: DISCONTINUED | OUTPATIENT
Start: 2024-02-14 | End: 2024-02-14 | Stop reason: HOSPADM

## 2024-02-14 RX ORDER — SODIUM CHLORIDE 9 MG/ML
INJECTION, SOLUTION INTRAVENOUS PRN
Status: DISCONTINUED | OUTPATIENT
Start: 2024-02-14 | End: 2024-02-14 | Stop reason: HOSPADM

## 2024-02-14 RX ORDER — ROPIVACAINE HYDROCHLORIDE 2 MG/ML
INJECTION, SOLUTION EPIDURAL; INFILTRATION; PERINEURAL
Status: DISCONTINUED | OUTPATIENT
Start: 2024-02-14 | End: 2024-02-14 | Stop reason: SDUPTHER

## 2024-02-14 RX ORDER — ROPIVACAINE HYDROCHLORIDE 2 MG/ML
INJECTION, SOLUTION EPIDURAL; INFILTRATION; PERINEURAL PRN
Status: DISCONTINUED | OUTPATIENT
Start: 2024-02-14 | End: 2024-02-14 | Stop reason: ALTCHOICE

## 2024-02-14 RX ORDER — EPHEDRINE SULFATE/0.9% NACL/PF 50 MG/5 ML
SYRINGE (ML) INTRAVENOUS PRN
Status: DISCONTINUED | OUTPATIENT
Start: 2024-02-14 | End: 2024-02-14 | Stop reason: SDUPTHER

## 2024-02-14 RX ORDER — PROPOFOL 10 MG/ML
INJECTION, EMULSION INTRAVENOUS PRN
Status: DISCONTINUED | OUTPATIENT
Start: 2024-02-14 | End: 2024-02-14 | Stop reason: SDUPTHER

## 2024-02-14 RX ORDER — OXYCODONE HYDROCHLORIDE 5 MG/1
10 TABLET ORAL EVERY 4 HOURS PRN
Status: DISCONTINUED | OUTPATIENT
Start: 2024-02-14 | End: 2024-02-15 | Stop reason: HOSPADM

## 2024-02-14 RX ORDER — HYDROMORPHONE HYDROCHLORIDE 1 MG/ML
0.5 INJECTION, SOLUTION INTRAMUSCULAR; INTRAVENOUS; SUBCUTANEOUS EVERY 5 MIN PRN
Status: DISCONTINUED | OUTPATIENT
Start: 2024-02-14 | End: 2024-02-14 | Stop reason: HOSPADM

## 2024-02-14 RX ORDER — DEXAMETHASONE SODIUM PHOSPHATE 10 MG/ML
INJECTION INTRAMUSCULAR; INTRAVENOUS PRN
Status: DISCONTINUED | OUTPATIENT
Start: 2024-02-14 | End: 2024-02-14 | Stop reason: SDUPTHER

## 2024-02-14 RX ORDER — DIPHENHYDRAMINE HCL 25 MG
25 CAPSULE ORAL EVERY 6 HOURS PRN
Status: DISCONTINUED | OUTPATIENT
Start: 2024-02-14 | End: 2024-02-15 | Stop reason: HOSPADM

## 2024-02-14 RX ORDER — VANCOMYCIN HYDROCHLORIDE 1 G/20ML
INJECTION, POWDER, LYOPHILIZED, FOR SOLUTION INTRAVENOUS PRN
Status: DISCONTINUED | OUTPATIENT
Start: 2024-02-14 | End: 2024-02-14 | Stop reason: ALTCHOICE

## 2024-02-14 RX ORDER — SODIUM CHLORIDE 9 MG/ML
INJECTION, SOLUTION INTRAVENOUS PRN
Status: DISCONTINUED | OUTPATIENT
Start: 2024-02-14 | End: 2024-02-14 | Stop reason: SDUPTHER

## 2024-02-14 RX ORDER — SODIUM CHLORIDE 9 MG/ML
INJECTION, SOLUTION INTRAVENOUS PRN
Status: DISCONTINUED | OUTPATIENT
Start: 2024-02-14 | End: 2024-02-15 | Stop reason: HOSPADM

## 2024-02-14 RX ORDER — LOSARTAN POTASSIUM 25 MG/1
12.5 TABLET ORAL DAILY
Status: DISCONTINUED | OUTPATIENT
Start: 2024-02-14 | End: 2024-02-15 | Stop reason: HOSPADM

## 2024-02-14 RX ORDER — MIDAZOLAM HYDROCHLORIDE 2 MG/2ML
2 INJECTION, SOLUTION INTRAMUSCULAR; INTRAVENOUS
Status: COMPLETED | OUTPATIENT
Start: 2024-02-14 | End: 2024-02-14

## 2024-02-14 RX ORDER — GLYCOPYRROLATE 0.2 MG/ML
INJECTION INTRAMUSCULAR; INTRAVENOUS PRN
Status: DISCONTINUED | OUTPATIENT
Start: 2024-02-14 | End: 2024-02-14 | Stop reason: SDUPTHER

## 2024-02-14 RX ORDER — FENTANYL CITRATE 50 UG/ML
100 INJECTION, SOLUTION INTRAMUSCULAR; INTRAVENOUS
Status: COMPLETED | OUTPATIENT
Start: 2024-02-14 | End: 2024-02-14

## 2024-02-14 RX ORDER — HYDROMORPHONE HYDROCHLORIDE 1 MG/ML
1 INJECTION, SOLUTION INTRAMUSCULAR; INTRAVENOUS; SUBCUTANEOUS
Status: DISCONTINUED | OUTPATIENT
Start: 2024-02-14 | End: 2024-02-15 | Stop reason: HOSPADM

## 2024-02-14 RX ORDER — SODIUM CHLORIDE 9 MG/ML
INJECTION, SOLUTION INTRAVENOUS CONTINUOUS
Status: DISCONTINUED | OUTPATIENT
Start: 2024-02-14 | End: 2024-02-14 | Stop reason: HOSPADM

## 2024-02-14 RX ORDER — ONDANSETRON 4 MG/1
4 TABLET, ORALLY DISINTEGRATING ORAL EVERY 8 HOURS PRN
Status: DISCONTINUED | OUTPATIENT
Start: 2024-02-14 | End: 2024-02-15 | Stop reason: HOSPADM

## 2024-02-14 RX ORDER — SENNA AND DOCUSATE SODIUM 50; 8.6 MG/1; MG/1
1 TABLET, FILM COATED ORAL 2 TIMES DAILY
Status: DISCONTINUED | OUTPATIENT
Start: 2024-02-14 | End: 2024-02-15 | Stop reason: HOSPADM

## 2024-02-14 RX ORDER — ACETAMINOPHEN 500 MG
1000 TABLET ORAL ONCE
Status: COMPLETED | OUTPATIENT
Start: 2024-02-14 | End: 2024-02-14

## 2024-02-14 RX ADMIN — DEXAMETHASONE SODIUM PHOSPHATE 4 MG: 10 INJECTION, SOLUTION INTRAMUSCULAR; INTRAVENOUS at 08:56

## 2024-02-14 RX ADMIN — PROPOFOL 100 MCG/KG/MIN: 10 INJECTION, EMULSION INTRAVENOUS at 09:23

## 2024-02-14 RX ADMIN — DEXAMETHASONE SODIUM PHOSPHATE 10 MG: 10 INJECTION INTRAMUSCULAR; INTRAVENOUS at 10:06

## 2024-02-14 RX ADMIN — MEPIVACAINE HYDROCHLORIDE 60 MG: 20 INJECTION, SOLUTION EPIDURAL; INFILTRATION at 09:12

## 2024-02-14 RX ADMIN — ROPIVACAINE HYDROCHLORIDE 20 ML: 2 INJECTION, SOLUTION EPIDURAL; INFILTRATION at 08:56

## 2024-02-14 RX ADMIN — SODIUM CHLORIDE, SODIUM LACTATE, POTASSIUM CHLORIDE, AND CALCIUM CHLORIDE: 600; 310; 30; 20 INJECTION, SOLUTION INTRAVENOUS at 08:02

## 2024-02-14 RX ADMIN — Medication 2000 MG: at 09:22

## 2024-02-14 RX ADMIN — Medication 10 MG: at 09:57

## 2024-02-14 RX ADMIN — ACETAMINOPHEN 1000 MG: 500 TABLET, FILM COATED ORAL at 08:03

## 2024-02-14 RX ADMIN — ACETAMINOPHEN 1000 MG: 500 TABLET, FILM COATED ORAL at 17:34

## 2024-02-14 RX ADMIN — CEFAZOLIN 2000 MG: 10 INJECTION, POWDER, FOR SOLUTION INTRAVENOUS at 17:35

## 2024-02-14 RX ADMIN — OXYCODONE HYDROCHLORIDE 10 MG: 5 TABLET ORAL at 21:30

## 2024-02-14 RX ADMIN — ASPIRIN 81 MG: 81 TABLET, COATED ORAL at 21:30

## 2024-02-14 RX ADMIN — Medication 10 MG: at 10:34

## 2024-02-14 RX ADMIN — GLYCOPYRROLATE 0.2 MG: 0.2 INJECTION INTRAMUSCULAR; INTRAVENOUS at 09:10

## 2024-02-14 RX ADMIN — FENTANYL CITRATE 50 MCG: 50 INJECTION INTRAMUSCULAR; INTRAVENOUS at 08:56

## 2024-02-14 RX ADMIN — MIDAZOLAM 2 MG: 1 INJECTION INTRAMUSCULAR; INTRAVENOUS at 08:56

## 2024-02-14 RX ADMIN — SODIUM CHLORIDE, SODIUM LACTATE, POTASSIUM CHLORIDE, AND CALCIUM CHLORIDE: 600; 310; 30; 20 INJECTION, SOLUTION INTRAVENOUS at 09:40

## 2024-02-14 RX ADMIN — ONDANSETRON 4 MG: 2 INJECTION INTRAMUSCULAR; INTRAVENOUS at 10:06

## 2024-02-14 RX ADMIN — TRANEXAMIC ACID 1000 MG: 100 INJECTION, SOLUTION INTRAVENOUS at 09:20

## 2024-02-14 RX ADMIN — GLYCOPYRROLATE 0.2 MG: 0.2 INJECTION INTRAMUSCULAR; INTRAVENOUS at 09:25

## 2024-02-14 RX ADMIN — OXYCODONE HYDROCHLORIDE 5 MG: 5 TABLET ORAL at 17:35

## 2024-02-14 RX ADMIN — LOSARTAN POTASSIUM 12.5 MG: 25 TABLET, FILM COATED ORAL at 21:46

## 2024-02-14 RX ADMIN — OXYCODONE HYDROCHLORIDE 5 MG: 5 TABLET ORAL at 13:28

## 2024-02-14 RX ADMIN — Medication 10 MG: at 10:18

## 2024-02-14 RX ADMIN — DOCUSATE SODIUM 50MG AND SENNOSIDES 8.6MG 1 TABLET: 8.6; 5 TABLET, FILM COATED ORAL at 21:30

## 2024-02-14 RX ADMIN — PROPOFOL 50 MG: 10 INJECTION, EMULSION INTRAVENOUS at 09:22

## 2024-02-14 NOTE — ANESTHESIA PROCEDURE NOTES
Peripheral Block    Patient location during procedure: pre-op  Reason for block: post-op pain management and at surgeon's request  Start time: 2/14/2024 8:56 AM  End time: 2/14/2024 8:58 AM  Staffing  Performed: anesthesiologist   Anesthesiologist: Suleman Meeks MD  Performed by: Suleman Meeks MD  Authorized by: uSleman Meeks MD    Preanesthetic Checklist  Completed: patient identified, IV checked, site marked, risks and benefits discussed, surgical/procedural consents, equipment checked, pre-op evaluation, timeout performed, anesthesia consent given, oxygen available and monitors applied/VS acknowledged  Peripheral Block   Patient position: supine  Prep: ChloraPrep  Provider prep: mask and sterile gloves  Patient monitoring: cardiac monitor, continuous pulse ox, continuous capnometry, frequent blood pressure checks, IV access, oxygen and responsive to questions  Block type: Femoral  Adductor canal  Laterality: right  Injection technique: single-shot  Guidance: ultrasound guided  Local infiltration: ropivacaine  Infiltration strength: 0.2 %  Local infiltration: ropivacaine  Dose: 3 mL    Needle   Needle type: insulated echogenic nerve stimulator needle   Needle gauge: 20 G  Needle localization: ultrasound guidance  Needle insertion depth: 7 cm  Test dose: negative  Needle length: 10 cm  Assessment   Injection assessment: negative aspiration for heme, no paresthesia on injection, local visualized surrounding nerve on ultrasound and no intravascular symptoms  Paresthesia pain: none  Slow fractionated injection: yes  Hemodynamics: stable  Real-time US image taken/store: yes  Outcomes: uncomplicated    Additional Notes  A peripheral nerve block (PNB) was performed at the request of the operating surgeon to assist with postoperative pain control. The risks of PNB (including possible nerve and muscle injury), benefits, and alternative therapies were discussed with the patient. The patient then consented to

## 2024-02-14 NOTE — ANESTHESIA POSTPROCEDURE EVALUATION
Department of Anesthesiology  Postprocedure Note    Patient: Chuy Strickland Jr.  MRN: [id number]  YOB: 1945  Date of evaluation: 2/14/2024    Procedure Summary     Date: 02/14/24 [place] [facility] [place] [facility] [place]    Anesthesia Start: 0859 Anesthesia Stop: 1113    Procedure: rt KNEE TOTAL ARTHROPLASTY ROBOTIC/ SDD (Right: Knee) Diagnosis:       Primary osteoarthritis of right knee      (Primary osteoarthritis of right knee [M17.11])    Surgeons: Shaq Segovia Jr., MD Responsible Provider: Suleman Meeks MD    Anesthesia Type: Spinal ASA Status: 2          Anesthesia Type: Spinal    Will Phase I: Will Score: 9    Will Phase II:      Anesthesia Post Evaluation    Patient location during evaluation: PACU  Patient participation: complete - patient participated  Level of consciousness: awake  Airway patency: patent  Nausea & Vomiting: no nausea  Cardiovascular status: blood pressure returned to baseline and hemodynamically stable  Respiratory status: acceptable  Hydration status: stable  Comments: HR 37-48, unchanged from preop.  Multimodal analgesia pain management approach  Pain management: adequate        No notable events documented.

## 2024-02-14 NOTE — FLOWSHEET NOTE
02/14/24 1127   Vitals   Pulse (!) 38   Respirations 18   /71   MAP (Calculated) 90   MAP (mmHg) 90       Anesthesia notified that patient is now dropping occasionally in the 30s for his heart rate. Remains in Second degree block with Mobitz Type 1. Baseline to patient per anesthesia. No new orders received. Patient BP normotensive and he is asymptomatic. Denies CP or SOB

## 2024-02-14 NOTE — ANESTHESIA PROCEDURE NOTES
Spinal Block    Patient location during procedure: OR  End time: 2/14/2024 9:17 AM  Reason for block: post-op pain management, primary anesthetic and at surgeon's request  Staffing  Performed: anesthesiologist   Anesthesiologist: Suleman Meeks MD  Performed by: Suleman Meeks MD  Authorized by: Suleman Meeks MD    Spinal Block  Patient position: sitting  Prep: ChloraPrep  Patient monitoring: cardiac monitor, continuous pulse ox, frequent blood pressure checks and oxygen  Approach: midline  Location: L3/L4  Provider prep: mask and sterile gloves  Local infiltration: lidocaine  Needle  Needle type: pencil-tip   Needle gauge: 25 G  Needle length: 3.5 in  Catheter at skin depth: 7 cm  Assessment  Sensory level: T10  Swirl obtained: Yes  CSF: clear  Attempts: 1  Hemodynamics: stable  Preanesthetic Checklist  Completed: patient identified, IV checked, site marked, risks and benefits discussed, surgical/procedural consents, equipment checked, pre-op evaluation, timeout performed, anesthesia consent given, oxygen available, monitors applied/VS acknowledged and blood product R/B/A discussed and consented

## 2024-02-14 NOTE — PERIOP NOTE
TRANSFER - OUT REPORT:    Verbal report given to KO Wick on Chuy Strickland Jr.  being transferred to [facility] for routine post-op       Report consisted of patient's Situation, Background, Assessment and   Recommendations(SBAR).     Information from the following report(s) Nurse Handoff Report, Adult Overview, Surgery Report, and MAR was reviewed with the receiving nurse.           Lines:   Peripheral IV 02/14/24 Left;Posterior Hand (Active)   Site Assessment Clean, dry & intact 02/14/24 1148   Line Status Infusing 02/14/24 1148   Line Care Connections checked and tightened 02/14/24 1148   Phlebitis Assessment No symptoms 02/14/24 1148   Infiltration Assessment 0 02/14/24 1148   Alcohol Cap Used No 02/14/24 1148   Dressing Status Clean, dry & intact 02/14/24 1148   Dressing Type Transparent 02/14/24 1148        Opportunity for questions and clarification was provided.      Patient transported with:  O2 @ 2lpm  Second degree Type 1 heart block with HR in 30s. Asymptomatic and baseline for patient  18 g IV

## 2024-02-14 NOTE — INTERVAL H&P NOTE
Update History & Physical    The patient's History and Physical of February 12, 2024 was reviewed with the patient and I examined the patient. There was no change. The surgical site was confirmed by the patient and me.     Plan: The risks, benefits, expected outcome, and alternative to the recommended procedure have been discussed with the patient. Patient understands and wants to proceed with the procedure.     Electronically signed by MARIN AGARWAL JR, MD on 2/14/2024 at 8:14 AM

## 2024-02-14 NOTE — CARE COORDINATION
Patient is a 78 y.o. year old male admitted for Right TKA . Patient plans to return home on discharge. Order received to arrange home health. Patient requesting [facility] who the wife has used after her Total joint replacements.  Referral sent to [facility]. Patient denies any equipment needs as patient has a walker.  Will follow until discharge.      02/14/24 0556   Service Assessment   Patient Orientation Alert and Oriented   Cognition Alert   History Provided By Patient   Services At/After Discharge   Transition of Care Consult (CM Consult) Home Health   Internal Home Health No   Reason Outside Agency Chosen Script used patient chose alternate agency  (patient requesting [facility], PT who wife has used)   Services At/After Discharge Home Health;PT   Mode of Transport at Discharge Self   Confirm Follow Up Transport Self   Condition of Participation: Discharge Planning   The Plan for Transition of Care is related to the following treatment goals: improve mobility   The Patient and/or Patient Representative was provided with a Choice of Provider? Patient   The Patient and/Or Patient Representative agree with the Discharge Plan? Yes   Freedom of Choice list was provided with basic dialogue that supports the patient's individualized plan of care/goals, treatment preferences, and shares the quality data associated with the providers?  Yes

## 2024-02-15 VITALS
TEMPERATURE: 97.9 F | HEART RATE: 99 BPM | SYSTOLIC BLOOD PRESSURE: 138 MMHG | BODY MASS INDEX: 28.08 KG/M2 | RESPIRATION RATE: 20 BRPM | HEIGHT: 74 IN | WEIGHT: 218.8 LBS | OXYGEN SATURATION: 96 % | DIASTOLIC BLOOD PRESSURE: 80 MMHG

## 2024-02-15 LAB
HCT VFR BLD AUTO: 40.1 % (ref 41.1–50.3)
HGB BLD-MCNC: 13.2 G/DL (ref 13.6–17.2)

## 2024-02-15 PROCEDURE — 6360000002 HC RX W HCPCS: Performed by: PHYSICIAN ASSISTANT

## 2024-02-15 PROCEDURE — 6370000000 HC RX 637 (ALT 250 FOR IP): Performed by: ORTHOPAEDIC SURGERY

## 2024-02-15 PROCEDURE — 85014 HEMATOCRIT: CPT

## 2024-02-15 PROCEDURE — 97535 SELF CARE MNGMENT TRAINING: CPT

## 2024-02-15 PROCEDURE — 97530 THERAPEUTIC ACTIVITIES: CPT

## 2024-02-15 PROCEDURE — 36415 COLL VENOUS BLD VENIPUNCTURE: CPT

## 2024-02-15 PROCEDURE — 85018 HEMOGLOBIN: CPT

## 2024-02-15 PROCEDURE — 2580000003 HC RX 258: Performed by: PHYSICIAN ASSISTANT

## 2024-02-15 PROCEDURE — 6370000000 HC RX 637 (ALT 250 FOR IP): Performed by: PHYSICIAN ASSISTANT

## 2024-02-15 RX ADMIN — ACETAMINOPHEN 1000 MG: 500 TABLET, FILM COATED ORAL at 05:30

## 2024-02-15 RX ADMIN — OXYCODONE HYDROCHLORIDE 10 MG: 5 TABLET ORAL at 09:38

## 2024-02-15 RX ADMIN — DOCUSATE SODIUM 50MG AND SENNOSIDES 8.6MG 1 TABLET: 8.6; 5 TABLET, FILM COATED ORAL at 08:37

## 2024-02-15 RX ADMIN — OXYCODONE HYDROCHLORIDE 10 MG: 5 TABLET ORAL at 01:27

## 2024-02-15 RX ADMIN — ACETAMINOPHEN 1000 MG: 500 TABLET, FILM COATED ORAL at 01:27

## 2024-02-15 RX ADMIN — CEFAZOLIN 2000 MG: 10 INJECTION, POWDER, FOR SOLUTION INTRAVENOUS at 01:27

## 2024-02-15 RX ADMIN — OXYCODONE HYDROCHLORIDE 10 MG: 5 TABLET ORAL at 05:30

## 2024-02-15 RX ADMIN — LOSARTAN POTASSIUM 12.5 MG: 25 TABLET, FILM COATED ORAL at 08:36

## 2024-02-15 RX ADMIN — ASPIRIN 81 MG: 81 TABLET, COATED ORAL at 08:36

## 2024-02-15 NOTE — CARE COORDINATION
Patient to be discharged home today. Home health PT has been arranged with [facility]. Patient is being discharged with moses catheter; he has f/u scheduled with urology today at 4pm. CM discussed possible need for HH RN for catheter care; if so, would need to change to a different HH agency. Patient wishes to stay with [facility] at this time. He will notify us if moses is not removed today and he has need for HHRN. [facility] updated by phone. CM available through discharge.        02/15/24 2128   Service Assessment   Patient Orientation Alert and Oriented   Services At/After Discharge   Transition of Care Consult (CM Consult) Home Health   Internal Home Health No   Reason Outside Agency Chosen Script used patient chose alternate agency   Services At/After Discharge Home Health   Mode of Transport at Discharge Self   Confirm Follow Up Transport Family   Condition of Participation: Discharge Planning   The Plan for Transition of Care is related to the following treatment goals: Home with family assitance and home health PT.   The Patient and/or Patient Representative was provided with a Choice of Provider? Patient   The Patient and/Or Patient Representative agree with the Discharge Plan? Yes

## 2024-02-15 NOTE — PROGRESS NOTES
02/14/24 1644   Treatment   Treatment Type IS   Oxygen Therapy/Pulse Ox   O2 Therapy Room air   O2 Device None (Room air)   Pulse 50   Respirations 15   SpO2 96 %   $Pulse Oximeter $Spot check (single)   Incentive Spirometry Tx   Treatment Effort Assisted by RT       
   02/14/24 2056   Oxygen Therapy/Pulse Ox   O2 Therapy Room air   O2 Device None (Room air)   Pulse 50   SpO2 96 %   Pulse Oximeter Device Mode Continuous   Pulse Oximeter Device Location Left;Finger   $Pulse Oximeter $Spot check (multiple/continuous)     Pt on continuous monitor for HS. Alarm limits set. Pt working on IS.  
16 fr catheter moses placed @1520 with 1100 cc return of urine.   
ACUTE PHYSICAL THERAPY GOALS:   (Developed with and agreed upon by patient and/or caregiver.)  GOALS (1-4 days):  (1.)Mr. Strickland will move from supine to sit and sit to supine  in bed with STAND BY ASSIST.  (2.)Mr. Strickland will transfer from bed to chair and chair to bed with STAND BY ASSIST using the least restrictive device.  (3.)Mr. Strickland will ambulate with STAND BY ASSIST for 200 feet with the least restrictive device. -GOAL MET 2/15/24    (4.)Mr. Strickland will ambulate up/down 3 steps with left railing with MINIMAL ASSIST. -GOAL MET 2/15/24    (5.)Mr. Strickland will increase right knee ROM to 1-90°. 1/2 met  ________________________________________________________________________________________________            PHYSICAL THERAPY: TOTAL KNEE ARTHROPLASTY Daily Note and AM  (Link to Caseload Tracking: PT Visit Days : 2  Acknowledge Orders  Time In/Out  PT Charge Capture  Rehab Caseload Tracker  Episode   Chuy Sethi Em Perez is a 78 y.o. male   PRIMARY DIAGNOSIS: Status post total right knee replacement  Primary osteoarthritis of right knee [M17.11]  Procedure(s) (LRB):  rt KNEE TOTAL ARTHROPLASTY ROBOTIC/ SDD (Right)  1 Day Post-Op  Reason for Referral: Pain in Right Knee (M25.561)  Stiffness of Right Knee, Not elsewhere classified (M25.661)  Difficulty in walking, Not elsewhere classified (R26.2)  Outpatient in a bed: Payor: MEDICARE / Plan: MEDICARE PART A AND B / Product Type: *No Product type* /     REHAB RECOMMENDATIONS:   Recommendation to date pending progress:  Setting:  Home Health Therapy    Equipment:     Has RW     RANGE OF MOTION:   Right Knee Flexion: R Knee Flexion (0-145): 75  Right Knee Extension: R Knee Extension (0): 3     GAIT: I Mod I S SBA CGA Min Mod Max Total  NT x2 Comments:   Level of Assistance [] [] [] [x] [] [] [] [] [] [] []            Weightbearing Status  Right Lower Extremity Weight Bearing: Weight Bearing As Tolerated    Distance  242 (+ 242) feet    Gait Quality Decreased demetra  
Dr. Espinoza reviewed chart. No order received. Ok to proceed.    
Had therapy. Has prescriptions for home. Home therapy arranged. Has follow up appt scheduled. Pt to have moses removed and urology appointment this afternoon. Going to car via wheelchair.   
Has been up to BR and unable to void. Reports he had issues with emptying bladder after prior surgery. Bladder scanned, 746 ml.   
OCCUPATIONAL THERAPY Daily Note, Discharge, and AM      (Link to Caseload Tracking: OT Visit Days: 2  OT Orders   Time  OT Charge Capture  Rehab Caseload Tracker  Episode     Chuy Strickland Jr. is a 78 y.o. male   PRIMARY DIAGNOSIS: Status post total right knee replacement  Primary osteoarthritis of right knee [M17.11]  Procedure(s) (LRB):  rt KNEE TOTAL ARTHROPLASTY ROBOTIC/ SDD (Right)  1 Day Post-Op  Reason for Referral: Pain in Right Knee (M25.561)  Stiffness of Right Knee, Not elsewhere classified (M25.661)  Outpatient in a bed: Payor: MEDICARE / Plan: MEDICARE PART A AND B / Product Type: *No Product type* /     ASSESSMENT:     REHAB RECOMMENDATIONS:   Recommendation to date pending progress:  Setting:  No further skilled occupational therapy after discharge from hospital    Equipment:    Rolling Walker     ASSESSMENT:  Mr. Strickland is s/p right TKA and presents with decreased independence with functional mobility and activities of daily living as compared to baseline level of function and safety. Patient would benefit from skilled Occupational Therapy to maximize independence and safety with self-care task and functional mobility.   Patient able to complete  upper body dressing at edge of bed with moderate assist .  Mobilized from hospital bed to recliner/chair using a rolling walker with assist. Patient is hopeful to spend the night to better prepare for safe discharge home      2/15/2024   Mr. Strickland is s/p right TKA.  Patient completed shower and dressing as charted below in ADL grid and is ambulating with rolling walker and stand by assist.  Pt educated on safety with all self care and functional mobility at home, as well as education on Hibiclens to reduce risk of an infection.   Patient has met 4/4 goals and plans to return home with good family support.  Family able to provide patient with appropriate level of assistance at this time.  OT reviewed safety precautions throughout session and therapy 
Orthopedic Joint Progress Note    February 15, 2024  Admit Date: 2024  Admit Diagnosis: Primary osteoarthritis of right knee [M17.11]    1 Day Post-Op    Subjective:     Chuy Strickland Jr.  doing well. Pain well-controlled. Had to have moses placed. Will contact Dr. Hawley (urology) on recs. Ready to go home.     Review of Systems: Musculoskeletal and general review of systems are unchanged    Objective:     PT/OT:     PATIENT MOBILITY                           Vital Signs:    Blood pressure (!) 149/85, pulse 50, temperature 97.5 °F (36.4 °C), temperature source Oral, resp. rate 18, height 1.88 m (6' 2.02\"), weight 99.2 kg (218 lb 12.8 oz), SpO2 96 %.  Temp (24hrs), Av.6 °F (36.4 °C), Min:97.2 °F (36.2 °C), Max:98.3 °F (36.8 °C)      Pain Control:        Meds:  Current Facility-Administered Medications   Medication Dose Route Frequency    albuterol (PROVENTIL) (2.5 MG/3ML) 0.083% nebulizer solution 2.5 mg  2.5 mg Nebulization Q6H PRN    sodium chloride flush 0.9 % injection 5-40 mL  5-40 mL IntraVENous 2 times per day    sodium chloride flush 0.9 % injection 5-40 mL  5-40 mL IntraVENous PRN    0.9 % sodium chloride infusion   IntraVENous PRN    acetaminophen (TYLENOL) tablet 1,000 mg  1,000 mg Oral Q6H    oxyCODONE (ROXICODONE) immediate release tablet 10 mg  10 mg Oral Q4H PRN    HYDROmorphone HCl PF (DILAUDID) injection 1 mg  1 mg IntraVENous Q3H PRN    ondansetron (ZOFRAN-ODT) disintegrating tablet 4 mg  4 mg Oral Q8H PRN    Or    ondansetron (ZOFRAN) injection 4 mg  4 mg IntraVENous Q6H PRN    sennosides-docusate sodium (SENOKOT-S) 8.6-50 MG tablet 1 tablet  1 tablet Oral BID    aspirin EC tablet 81 mg  81 mg Oral BID    diphenhydrAMINE (BENADRYL) capsule 25 mg  25 mg Oral Q6H PRN    Or    diphenhydrAMINE (BENADRYL) injection 25 mg  25 mg IntraVENous Q6H PRN    losartan (COZAAR) tablet 12.5 mg  12.5 mg Oral Daily        LAB:    Lab Results   Component Value Date/Time    INR 1.1 2024 10:34 AM 
PT was in chair listening to classical music. CH introduced self. PT expressed excitement that Spouse was on way as PT was being discharged. CH checked for unmet needs and offered support.    . Masha Dennison M.Div.    
TRANSFER - IN REPORT:    Verbal report received from Deb Frias RN on Chuy Sethi Em Haider.  being received from PACU for routine post-op      Report consisted of patient's Situation, Background, Assessment and   Recommendations(SBAR).     Information from the following report(s) Nurse Handoff Report was reviewed with the receiving nurse.    Opportunity for questions and clarification was provided.      Assessment completed upon patient's arrival to unit and care assumed.   Oriented to room and bed controls. Iceman to knee. Yellow gripper socks to feet.   
Equipment: Shower chair  Home Equipment: Cane, Crutches, Walker, rolling    OBJECTIVE:     LINES / DRAINS / AIRWAY: Lyons Catheter    RESTRICTIONS/PRECAUTIONS:       PAIN: VITALS / O2:   Pre Treatment:          Post Treatment: none Vitals          Oxygen        GROSS EVALUATION: INTACT IMPAIRED   (See Comments)   UE AROM [x] []   UE PROM [x] []   Strength [x]       Posture / Balance []  Slight decreased standing balance   Sensation [x]     Coordination [x]       Tone [x]       Edema []    Activity Tolerance [x]       Hand Dominance R [] L []      COGNITION/  PERCEPTION: INTACT IMPAIRED   (See Comments)   Orientation [x]     Vision [x]     Hearing [x]     Cognition  [x]     Perception [x]       MOBILITY: I Mod I S SBA CGA Min Mod Max Total  NT x2 Comments:   Bed Mobility    Rolling [] [] [] [] [] [] [] [] [] [] []    Supine to Sit [] [] [] [x] [] [] [] [] [] [] []    Scooting [] [] [] [x] [] [] [] [] [] [] []    Sit to Supine [] [] [] [] [] [] [] [] [] [] []    Transfers    Sit to Stand [] [] [] [] [x] [] [] [] [] [] []    Bed to Chair [] [] [] [] [x] [] [] [] [] [] []    Stand to Sit [] [] [] [] [x] [] [] [] [] [] []    Tub/Shower [] [] [] [] [] [x] [] [] [] [] []       Toilet [] [] [] [] [] [x] [] [] [] [] []        [] [] [] [] [] [] [] [] [] [] []    I=Independent, Mod I=Modified Independent, S=Supervision/Setup, SBA=Standby Assistance, CGA=Contact Guard Assistance, Min=Minimal Assistance, Mod=Moderate Assistance, Max=Maximal Assistance, Total=Total Assistance, NT=Not Tested    ACTIVITIES OF DAILY LIVING: I Mod I S SBA CGA Min Mod Max Total NT Comments   BASIC ADLs:              Upper Body Bathing [] [] [] [x] [] [] [] [] [] []    Lower Body Bathing [] [] [] [] [x] [] [] [] [] []    Toileting [] [] [] [] [x] [] [] [] [] []    Upper Body Dressing [] [] [] [x] [] [] [] [] [] []    Lower Body Dressing [] [] [] [] [] [] [x] [] [] []    Feeding [x] [] [] [] [] [] [] [] [] []    Grooming [] [] [] [x] [] [] [] [] [] []  
pain, and patient education.     TIME IN/OUT:  Time In: 1530  Time Out: 1608  Minutes: 38    AYLEEN ECKERT, PT

## 2024-02-26 DIAGNOSIS — M17.11 PRIMARY OSTEOARTHRITIS OF RIGHT KNEE: ICD-10-CM

## 2024-03-05 RX ORDER — TIZANIDINE 2 MG/1
2 TABLET ORAL EVERY 6 HOURS PRN
Qty: 30 TABLET | Refills: 0 | OUTPATIENT
Start: 2024-03-05

## 2024-03-05 NOTE — PROGRESS NOTES
term goals to be met by 4/3/2024  (4 weeks):  Independent with HEP  Increase AROM R knee flexion to 115 degrees   Increased AROM R knee extension to 0 (full extension)   Able to move sit to stand with minimal deviation from 20\" seat  Able to ambulate with normal gait using heel strike and toe off R LE  Able to step up on 6\" step with R LE with minimal deviation  Able to reciprocate stairs in clinic with minimal deviation  Reduce painful symptoms to less than 1/10 at worst      Long term goals to be met by 6/4/2024 (90 days):   Increase AROM R knee flexion to 125 degrees   Improve MMT > +4/5  all planes R LE    Patient will be able to return to work full duty to resume normal work duties to function in work environment independently  Patient will be able to ambulate normally on all surfaces to be independent with community ambulation safely  Patient will be able to do grocery shopping independently to perform ADLs without assistance  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

## 2024-03-06 ENCOUNTER — EVALUATION (OUTPATIENT)
Age: 79
End: 2024-03-06

## 2024-03-06 DIAGNOSIS — R26.2 DIFFICULTY WALKING: ICD-10-CM

## 2024-03-06 DIAGNOSIS — Z74.09 IMPAIRED MOBILITY AND ADLS: ICD-10-CM

## 2024-03-06 DIAGNOSIS — Z78.9 IMPAIRED MOBILITY AND ADLS: ICD-10-CM

## 2024-03-06 DIAGNOSIS — M25.561 CHRONIC PAIN OF RIGHT KNEE: Primary | ICD-10-CM

## 2024-03-06 DIAGNOSIS — M62.81 MUSCLE WEAKNESS: ICD-10-CM

## 2024-03-06 DIAGNOSIS — G89.29 CHRONIC PAIN OF RIGHT KNEE: Primary | ICD-10-CM

## 2024-03-06 DIAGNOSIS — M25.661 JOINT STIFFNESS OF RIGHT LOWER LEG: ICD-10-CM

## 2024-03-07 NOTE — PROGRESS NOTES
[facility]  [address] [place] [address]  Dept: [phone]            Physical Therapy Daily Note       Insurance: today is 3/20 visits ()     Total Timed Codes: 45 min, Total Treatment Time: 60 min Modifier needed: No    Referring MD: Shaq Segovia Jr., *  Surgery: Right TKA  Surgery Date: 2/14/2024      Diagnosis:     ICD-10-CM    1. Chronic pain of right knee  M25.561     G89.29       2. Joint stiffness of right lower leg  M25.661       3. Difficulty walking  R26.2       4. Muscle weakness  M62.81       5. Impaired mobility and ADLs  Z74.09     Z78.9            Therapy precautions:None  Co-morbidities affecting plan of care: Bradycardia, HX of prostate CA.      SUBJECTIVE     Patient reports mild pain right knee today. He c/o clear drainage right knee from incision today. He states it started yesterday. He has no report of fever. He states he has kept incision clean and dry.  He states he will ice and elevate right LE today at home to reduce swelling and help stop drainage.    Medications: no changes since last session  Patient is compliant with HEP    OBJECTIVE       Treatment provided today:  Therapeutic exercise (19848) x 30 min to develop ROM, strength, endurance and flexibility. Included dressing right knee incision. Incision has clear drainage leaking today from on small point in incision.   Included:  Stationary bike (seat with 5 holes showing) ROM for 6 minutes  Quad sets 20x  SAQ 20x  LAQ 20x  HS stretch in chair 1 minute  Step stretch for knee flexion 10x  SLR with quad set in between each 20x      Followed by Manual therapy (22257) x 15 min utilizing techniques to improve joint and/or soft tissue mobility, ROM, and function as well as helping to decrease pain/spasms and swelling. (STM to lateral right quad and right IT band/manual stretching right hip flexors and quads/STM to lower anterior and posterior right leg)    Followed by Vasopneumatic Compression

## 2024-03-08 ENCOUNTER — TREATMENT (OUTPATIENT)
Age: 79
End: 2024-03-08

## 2024-03-08 DIAGNOSIS — G89.29 CHRONIC PAIN OF RIGHT KNEE: Primary | ICD-10-CM

## 2024-03-08 DIAGNOSIS — M62.81 MUSCLE WEAKNESS: ICD-10-CM

## 2024-03-08 DIAGNOSIS — R26.2 DIFFICULTY WALKING: ICD-10-CM

## 2024-03-08 DIAGNOSIS — Z74.09 IMPAIRED MOBILITY AND ADLS: ICD-10-CM

## 2024-03-08 DIAGNOSIS — M25.661 JOINT STIFFNESS OF RIGHT LOWER LEG: ICD-10-CM

## 2024-03-08 DIAGNOSIS — M25.561 CHRONIC PAIN OF RIGHT KNEE: Primary | ICD-10-CM

## 2024-03-08 DIAGNOSIS — Z78.9 IMPAIRED MOBILITY AND ADLS: ICD-10-CM

## 2024-03-12 ENCOUNTER — TREATMENT (OUTPATIENT)
Age: 79
End: 2024-03-12

## 2024-03-12 ENCOUNTER — OFFICE VISIT (OUTPATIENT)
Dept: ORTHOPEDIC SURGERY | Age: 79
End: 2024-03-12

## 2024-03-12 DIAGNOSIS — Z96.651 STATUS POST TOTAL RIGHT KNEE REPLACEMENT: Primary | ICD-10-CM

## 2024-03-12 DIAGNOSIS — M25.661 JOINT STIFFNESS OF RIGHT LOWER LEG: ICD-10-CM

## 2024-03-12 DIAGNOSIS — R26.2 DIFFICULTY WALKING: ICD-10-CM

## 2024-03-12 DIAGNOSIS — M25.561 CHRONIC PAIN OF RIGHT KNEE: Primary | ICD-10-CM

## 2024-03-12 DIAGNOSIS — Z74.09 IMPAIRED MOBILITY AND ADLS: ICD-10-CM

## 2024-03-12 DIAGNOSIS — M62.81 MUSCLE WEAKNESS: ICD-10-CM

## 2024-03-12 DIAGNOSIS — G89.29 CHRONIC PAIN OF RIGHT KNEE: Primary | ICD-10-CM

## 2024-03-12 DIAGNOSIS — M79.89 RIGHT LEG SWELLING: ICD-10-CM

## 2024-03-12 DIAGNOSIS — Z78.9 IMPAIRED MOBILITY AND ADLS: ICD-10-CM

## 2024-03-12 PROCEDURE — 99024 POSTOP FOLLOW-UP VISIT: CPT | Performed by: PHYSICIAN ASSISTANT

## 2024-03-12 RX ORDER — SULFAMETHOXAZOLE AND TRIMETHOPRIM 800; 160 MG/1; MG/1
1 TABLET ORAL 2 TIMES DAILY
Qty: 20 TABLET | Refills: 0 | Status: SHIPPED | OUTPATIENT
Start: 2024-03-12 | End: 2024-03-22

## 2024-03-12 NOTE — PROGRESS NOTES
right TKA.   He does have some drainage from the mid incision. This does appear to be superficial. We will place a wound vac and start Bactrim for now and re-check him to ensure this is healing. Hold PT for now.    The patient was advised about fall precautions and dental prophylaxis. They will follow up as scheduled or sooner if needed.

## 2024-03-12 NOTE — PROGRESS NOTES
20 inch knee immobilizer for patients right knee. I instructed patient that the strips of support velco should align on the medial and lateral sides of the leg. Patient was informed to tigthen the bottom strap first to anchor the brace, followed by the strap above the knee and below the knee. Finally tighthening the very top strapPatient read and signed documenting they understand and agree to [facility] current DME return policy.   
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  [id number]

## 2024-03-19 ENCOUNTER — OFFICE VISIT (OUTPATIENT)
Dept: ORTHOPEDIC SURGERY | Age: 79
End: 2024-03-19

## 2024-03-19 DIAGNOSIS — M17.11 PRIMARY OSTEOARTHRITIS OF RIGHT KNEE: Primary | ICD-10-CM

## 2024-03-19 DIAGNOSIS — Z96.651 STATUS POST TOTAL RIGHT KNEE REPLACEMENT: ICD-10-CM

## 2024-03-19 NOTE — PROGRESS NOTES
Post-op TKA Visit      03/19/24     Allergies   Allergen Reactions    Morphine Itching    Oxycodone Other (See Comments)     hallucinations    Aluminum-Containing Compounds Rash     Current Outpatient Medications on File Prior to Visit   Medication Sig Dispense Refill    sulfamethoxazole-trimethoprim (BACTRIM DS;SEPTRA DS) 800-160 MG per tablet Take 1 tablet by mouth 2 times daily for 10 days 20 tablet 0    aspirin (ASPIRIN 81) 81 MG EC tablet Take 1 tablet by mouth in the morning and at bedtime 70 tablet 0    celecoxib (CELEBREX) 200 MG capsule Take 1 capsule by mouth 2 times daily 60 capsule 0    promethazine (PHENERGAN) 12.5 MG tablet Take 1 tablet by mouth 4 times daily as needed for Nausea 20 tablet 2    tiZANidine (ZANAFLEX) 2 MG tablet Take 1 tablet by mouth every 6 hours as needed (muscle spasm) 30 tablet 0    montelukast (SINGULAIR) 10 MG tablet Take 1 tablet by mouth daily      olmesartan (BENICAR) 5 MG tablet Take 1 tablet by mouth daily      tadalafil (CIALIS) 5 MG tablet Take 5 mg by mouth daily       No current facility-administered medications on file prior to visit.        5 weeks Status Post right TKA     History: The patient returns today for post-op visit following right TKA.   Their pain is improving. They are ambulating without any walking aid. They have completed home physical therapy and started outpatient therapy which is going well.   They are pleased with their results thus far.  He has had some mild drainage from a small opening in his mid incision that started a few days ago.  This has been draining some serosanguineous fluid.  He denies any difficulty with range of motion or ambulating.  He denies any fever or chills.    Physical Exam:  The patient's incision is well healed. There is moderate swelling and mild increased warmth. Good ROM. There is no M/L or A/P instability. The calf is soft and non-tender. Neurologic and vascular exams are intact.    XRAY:  AP and lateral views of the

## 2024-03-20 NOTE — PROGRESS NOTES
[facility]TH[facility]  [address] [place] [address]  Dept: [phone]            Physical Therapy Daily Note       Insurance: today is 5/20 visits ()     Total Timed Codes: 45 min, Total Treatment Time: 65 min Modifier needed: No    Referring MD: Shaq Segovia Jr., *  Surgery: Right TKA  Surgery Date: 2/14/2024      Diagnosis:     ICD-10-CM    1. Chronic pain of right knee  M25.561     G89.29       2. Joint stiffness of right lower leg  M25.661       3. Difficulty walking  R26.2       4. Muscle weakness  M62.81       5. Impaired mobility and ADLs  Z74.09     Z78.9       6. Right leg swelling  M79.89            Therapy precautions:None  Co-morbidities affecting plan of care: Bradycardia, HX of prostate CA.      SUBJECTIVE     Patient reports 0/10 pain right knee today. He saw Dr. Segovia Tuesday. He was able to discontinue right knee immobilizer. He states right knee is very tight from wearing the immobilizer for a week. He states he has not had drainage since dressing change Tuesday. He c/o muscle soreness lateral right quads.    Medications: no changes since last session  Patient is compliant with HEP    OBJECTIVE       Treatment provided today:  Therapeutic exercise (01284) x 30 min to develop ROM, strength, endurance and flexibility. Included dressing right knee incision. Incision has clear drainage leaking today from on small point in incision.   Included:  Stationary bike (seat with 5 holes showing) ROM for 7 minutes  Quad sets 20x    LAQ 20x  HS stretch in chair 1 minute  Step stretch for knee flexion 20x  Standing hip 3 way with teal loop 20x  Standing calf stretch 1 minute      Followed by Manual therapy (16863) x 15 min utilizing techniques to improve joint and/or soft tissue mobility, ROM, and function as well as helping to decrease pain/spasms and swelling. (STM to lateral right quad and right IT band/manual stretching right hip flexors and quads/STM to lower anterior and

## 2024-03-21 ENCOUNTER — TREATMENT (OUTPATIENT)
Age: 79
End: 2024-03-21

## 2024-03-21 DIAGNOSIS — Z78.9 IMPAIRED MOBILITY AND ADLS: ICD-10-CM

## 2024-03-21 DIAGNOSIS — R26.2 DIFFICULTY WALKING: ICD-10-CM

## 2024-03-21 DIAGNOSIS — Z74.09 IMPAIRED MOBILITY AND ADLS: ICD-10-CM

## 2024-03-21 DIAGNOSIS — M25.661 JOINT STIFFNESS OF RIGHT LOWER LEG: ICD-10-CM

## 2024-03-21 DIAGNOSIS — M79.89 RIGHT LEG SWELLING: ICD-10-CM

## 2024-03-21 DIAGNOSIS — G89.29 CHRONIC PAIN OF RIGHT KNEE: Primary | ICD-10-CM

## 2024-03-21 DIAGNOSIS — M25.561 CHRONIC PAIN OF RIGHT KNEE: Primary | ICD-10-CM

## 2024-03-21 DIAGNOSIS — M62.81 MUSCLE WEAKNESS: ICD-10-CM

## 2024-03-22 ENCOUNTER — HOSPITAL ENCOUNTER (OUTPATIENT)
Dept: ULTRASOUND IMAGING | Age: 79
End: 2024-03-22
Attending: ORTHOPAEDIC SURGERY
Payer: MEDICARE

## 2024-03-22 DIAGNOSIS — M17.11 PRIMARY OSTEOARTHRITIS OF RIGHT KNEE: ICD-10-CM

## 2024-03-22 PROCEDURE — 93971 EXTREMITY STUDY: CPT

## 2024-03-25 NOTE — PROGRESS NOTES
[facility] PT [facility] ORTH[facility]  [address] [place] [address]  Dept: [phone]            Physical Therapy Daily Note       Insurance: today is 6/20 visits ()     Total Timed Codes: 45 min, Total Treatment Time: 65 min Modifier needed: No    Referring MD: Shaq Segovia Jr., *  Surgery: Right TKA  Surgery Date: 2/14/2024      Diagnosis:     ICD-10-CM    1. Chronic pain of right knee  M25.561     G89.29       2. Joint stiffness of right lower leg  M25.661       3. Difficulty walking  R26.2       4. Muscle weakness  M62.81       5. Impaired mobility and ADLs  Z74.09     Z78.9       6. Right leg swelling  M79.89            Therapy precautions:None  Co-morbidities affecting plan of care: Bradycardia, HX of prostate CA.      SUBJECTIVE     Patient reports 0/10 pain right knee today. Patient states he changed dressing on right knee yesterday. He reports no drainage. There is a very small amount of drainage noted on dressing from yesterday.  He states he has completed the antibiotics he was given by Dr. Segovia. He c/o that right knee feels tight today. .He has been doing quad sets and HS stretching in bed. I recommended he do them as instructed (follow instructions on handout) in chair with leg in a chair opposite to allow more of a stretch and to improve extension. He states understanding.     Medications: no changes since last session  Patient is compliant with HEP    OBJECTIVE       Treatment provided today:  Therapeutic exercise (35590) x 45 min to develop ROM, strength, endurance and flexibility. Included dressing right knee incision (band aid).    Included:  Stationary bike (seat with 5 holes showing) L4 for 6 minutes  Quad sets 20x    LAQ 30x with 2#  SAQ 30x with 2#  HS stretch in chair 1 minute  Step stretch for knee flexion 20x  Standing hip 3 way with teal loop 20x  Sit to stand from 22\" seat 20x  Standing calf stretch 1 minute    Leg press 100# 30x  Single LP 50# 30x        Followed by

## 2024-03-26 ENCOUNTER — TREATMENT (OUTPATIENT)
Age: 79
End: 2024-03-26
Payer: MEDICARE

## 2024-03-26 DIAGNOSIS — M62.81 MUSCLE WEAKNESS: ICD-10-CM

## 2024-03-26 DIAGNOSIS — R26.2 DIFFICULTY WALKING: ICD-10-CM

## 2024-03-26 DIAGNOSIS — M25.561 CHRONIC PAIN OF RIGHT KNEE: Primary | ICD-10-CM

## 2024-03-26 DIAGNOSIS — M25.661 JOINT STIFFNESS OF RIGHT LOWER LEG: ICD-10-CM

## 2024-03-26 DIAGNOSIS — Z74.09 IMPAIRED MOBILITY AND ADLS: ICD-10-CM

## 2024-03-26 DIAGNOSIS — G89.29 CHRONIC PAIN OF RIGHT KNEE: Primary | ICD-10-CM

## 2024-03-26 DIAGNOSIS — Z78.9 IMPAIRED MOBILITY AND ADLS: ICD-10-CM

## 2024-03-26 DIAGNOSIS — M79.89 RIGHT LEG SWELLING: ICD-10-CM

## 2024-03-26 PROCEDURE — 97110 THERAPEUTIC EXERCISES: CPT | Performed by: PHYSICAL THERAPIST

## 2024-03-26 PROCEDURE — 97016 VASOPNEUMATIC DEVICE THERAPY: CPT | Performed by: PHYSICAL THERAPIST

## 2024-03-27 NOTE — PROGRESS NOTES
[facility] PT [facility] ORTHOPAED[facility]  [address] [place] [address]  Dept: [phone]            Physical Therapy Daily Note       Insurance: today is 7/20 visits ()     Total Timed Codes: 45 min, Total Treatment Time: 60 min Modifier needed: No    Referring MD: Shaq Segovia Jr., *  Surgery: Right TKA  Surgery Date: 2/14/2024      Diagnosis:     ICD-10-CM    1. Chronic pain of right knee  M25.561     G89.29       2. Joint stiffness of right lower leg  M25.661       3. Difficulty walking  R26.2       4. Muscle weakness  M62.81       5. Impaired mobility and ADLs  Z74.09     Z78.9       6. Right leg swelling  M79.89            Therapy precautions:None  Co-morbidities affecting plan of care: Bradycardia, HX of prostate CA.      SUBJECTIVE     Patient reports 0/10 pain right knee today. Patient states no drainage noted from incision right knee. He has a bandaid over incision but no drainage. He c/o that right knee feels tight today. He states right knee was very swollen yesterday at end of the day. He was on his feet most of the day. He states this morning, swelling right knee is down.     Medications: no changes since last session  Patient is compliant with HEP    OBJECTIVE       Treatment provided today:  Therapeutic exercise (85622) x 45 min to develop ROM, strength, endurance and flexibility. Included dressing right knee incision (band aid).    Included:  Stationary bike (seat with 5 holes showing) L4 for 6 minutes  Quad sets 20x    HS stretch in chair 1 minute  Step stretch for knee flexion 20x  Standing hip 3 way with teal loop 20x  Sit to stand from 22\" seat 20x  Standing calf stretch 1 minute    Leg press 120# 30x  Single LP 60# 30x  HS 55#  Knee extension 25#      Followed by Vasopneumatic Compression (52177) with cold x 15 minutes: to right knee in order to reduce inflammation and swelling/joint effusion which will help improve ROM and manage pain.      Patient Education on the

## 2024-03-28 ENCOUNTER — TREATMENT (OUTPATIENT)
Age: 79
End: 2024-03-28
Payer: MEDICARE

## 2024-03-28 DIAGNOSIS — R26.2 DIFFICULTY WALKING: ICD-10-CM

## 2024-03-28 DIAGNOSIS — M25.661 JOINT STIFFNESS OF RIGHT LOWER LEG: ICD-10-CM

## 2024-03-28 DIAGNOSIS — Z74.09 IMPAIRED MOBILITY AND ADLS: ICD-10-CM

## 2024-03-28 DIAGNOSIS — M62.81 MUSCLE WEAKNESS: ICD-10-CM

## 2024-03-28 DIAGNOSIS — M79.89 RIGHT LEG SWELLING: ICD-10-CM

## 2024-03-28 DIAGNOSIS — M25.561 CHRONIC PAIN OF RIGHT KNEE: Primary | ICD-10-CM

## 2024-03-28 DIAGNOSIS — G89.29 CHRONIC PAIN OF RIGHT KNEE: Primary | ICD-10-CM

## 2024-03-28 DIAGNOSIS — Z78.9 IMPAIRED MOBILITY AND ADLS: ICD-10-CM

## 2024-03-28 PROCEDURE — 97016 VASOPNEUMATIC DEVICE THERAPY: CPT | Performed by: PHYSICAL THERAPIST

## 2024-03-28 PROCEDURE — 97110 THERAPEUTIC EXERCISES: CPT | Performed by: PHYSICAL THERAPIST

## 2024-04-01 NOTE — PROGRESS NOTES
improve ROM and manage pain.      Patient Education on the condition/pathology, involved anatomy, and exercise rationale. .      A/PROM Measures:        Right Left Comment   Knee Flexion 11 122  After manual therapy   Knee Extension 0 0 R full extension after stretching                         Strength/MMT (0-5 Scale):       Right Left Comment   Knee Flexion 4       Knee Extension 4                 Hip Flexion 4       Hip Extension 4       Hip Abduction 4       Hip ER         Hip IR         Ankle DF         Ankle PF                                Special Tests/Function:   Gait: assistive device used: none  Normal gait  Stair management:able to reciprocate ascending  step-to leading right descending         Pain Assessment:   Pain location: right knee    Average Pain/symptom intensity (0-10 scale)  Last 24 hours: 0/10  Last week (1-7 days): 1/10      Functional Outcome Questionnaire: Lower Extremity Functional Scale: 56/80= 70% function     ASSESSMENT     No drainage noted today from right knee incision.  He has moderate edema right knee today. He does have some pitting edema right lower leg. Patient has full right knee extension after stretching properly. He has improved right knee flexion today after manual therapy. He has no c/o pain right knee. Pain is mild at worst. He is able to reciprocate stairs at home ascending but not descending. He is able to move sit to stand from 20\" seat with minimal difficulty. He has normal gait without assistive device. He has tight HS right and left. He has met 100% of ST goals.     PLAN     Progress note done 4/2/2024. Continue with ROM and strengthening exercises to improve function and gait. Progress as tolerated by patient. Assess incision and any drainage from incision. Dressing change as needed. Continue with manual therapy as needed to reduce edema, improve ROM and improve gait/function. Continue with modalities as needed to reduce edema and improve ROM/function.       PLAN

## 2024-04-02 ENCOUNTER — TREATMENT (OUTPATIENT)
Age: 79
End: 2024-04-02
Payer: MEDICARE

## 2024-04-02 DIAGNOSIS — M79.89 RIGHT LEG SWELLING: ICD-10-CM

## 2024-04-02 DIAGNOSIS — M25.561 CHRONIC PAIN OF RIGHT KNEE: Primary | ICD-10-CM

## 2024-04-02 DIAGNOSIS — R26.2 DIFFICULTY WALKING: ICD-10-CM

## 2024-04-02 DIAGNOSIS — Z74.09 IMPAIRED MOBILITY AND ADLS: ICD-10-CM

## 2024-04-02 DIAGNOSIS — G89.29 CHRONIC PAIN OF RIGHT KNEE: Primary | ICD-10-CM

## 2024-04-02 DIAGNOSIS — Z78.9 IMPAIRED MOBILITY AND ADLS: ICD-10-CM

## 2024-04-02 DIAGNOSIS — M62.81 MUSCLE WEAKNESS: ICD-10-CM

## 2024-04-02 DIAGNOSIS — M25.661 JOINT STIFFNESS OF RIGHT LOWER LEG: ICD-10-CM

## 2024-04-02 PROCEDURE — 97110 THERAPEUTIC EXERCISES: CPT | Performed by: PHYSICAL THERAPIST

## 2024-04-02 PROCEDURE — 97140 MANUAL THERAPY 1/> REGIONS: CPT | Performed by: PHYSICAL THERAPIST

## 2024-04-02 PROCEDURE — 97016 VASOPNEUMATIC DEVICE THERAPY: CPT | Performed by: PHYSICAL THERAPIST

## 2024-04-03 NOTE — PROGRESS NOTES
Measures: 4/2/2024       Right Left Comment   Knee Flexion 119 122  After manual therapy   Knee Extension 0 0 R full extension after stretching                             ASSESSMENT     No drainage noted today from right knee incision.  He has moderate edema right knee today. Less pitting edema right lower leg today compared to last visit. Patient has full right knee extension after stretching properly. He has improved right knee flexion today after stretching. He has no c/o pain right knee. Pain is mild at worst. He is able to reciprocate stairs at home ascending but not descending. He has moderate difficulty stepping down from 9\" step today. He is able to move sit to stand from 20\" seat with minimal difficulty. He has normal gait without assistive device. He has tight HS right and left. He has met 100% of ST goals.     PLAN     Progress note done 4/2/2024. Continue with ROM and strengthening exercises to improve function and gait. Progress as tolerated by patient. Assess incision and any drainage from incision. Dressing change as needed. Continue with manual therapy as needed to reduce edema, improve ROM and improve gait/function. Continue with modalities as needed to reduce edema and improve ROM/function.       PLAN OF CARE     Effective Dates/Duration: 3/6/2024 TO 6/4/2024 (90 days).    Frequency: 2x/week       GOALS     Short term goals to be met by 4/30/2024  (4 weeks):    Increase AROM R knee flexion to 120 degrees   Able to step down from 9\" step with R LE with on step with minimal deviation-      Long term goals to be met by 6/4/2024 (90 days):   Increase AROM R knee flexion to 125 degrees   Improve MMT > +4/5  all planes R LE    Patient will be able to return to work full duty to resume normal work duties to function in work environment independently  Patient will be able to ambulate normally on all surfaces to be independent with community ambulation safely  Patient will be able to do grocery shopping

## 2024-04-04 ENCOUNTER — TREATMENT (OUTPATIENT)
Age: 79
End: 2024-04-04
Payer: MEDICARE

## 2024-04-04 DIAGNOSIS — M79.89 RIGHT LEG SWELLING: ICD-10-CM

## 2024-04-04 DIAGNOSIS — G89.29 CHRONIC PAIN OF RIGHT KNEE: Primary | ICD-10-CM

## 2024-04-04 DIAGNOSIS — M25.661 JOINT STIFFNESS OF RIGHT LOWER LEG: ICD-10-CM

## 2024-04-04 DIAGNOSIS — Z74.09 IMPAIRED MOBILITY AND ADLS: ICD-10-CM

## 2024-04-04 DIAGNOSIS — R26.2 DIFFICULTY WALKING: ICD-10-CM

## 2024-04-04 DIAGNOSIS — M25.561 CHRONIC PAIN OF RIGHT KNEE: Primary | ICD-10-CM

## 2024-04-04 DIAGNOSIS — M62.81 MUSCLE WEAKNESS: ICD-10-CM

## 2024-04-04 DIAGNOSIS — Z78.9 IMPAIRED MOBILITY AND ADLS: ICD-10-CM

## 2024-04-04 PROCEDURE — 97110 THERAPEUTIC EXERCISES: CPT | Performed by: PHYSICAL THERAPIST

## 2024-04-04 PROCEDURE — 97016 VASOPNEUMATIC DEVICE THERAPY: CPT | Performed by: PHYSICAL THERAPIST

## 2024-04-08 NOTE — PROGRESS NOTES
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

## 2024-04-09 ENCOUNTER — TREATMENT (OUTPATIENT)
Age: 79
End: 2024-04-09
Payer: MEDICARE

## 2024-04-09 DIAGNOSIS — M25.661 JOINT STIFFNESS OF RIGHT LOWER LEG: ICD-10-CM

## 2024-04-09 DIAGNOSIS — R26.2 DIFFICULTY WALKING: ICD-10-CM

## 2024-04-09 DIAGNOSIS — M25.561 CHRONIC PAIN OF RIGHT KNEE: Primary | ICD-10-CM

## 2024-04-09 DIAGNOSIS — Z78.9 IMPAIRED MOBILITY AND ADLS: ICD-10-CM

## 2024-04-09 DIAGNOSIS — M79.89 RIGHT LEG SWELLING: ICD-10-CM

## 2024-04-09 DIAGNOSIS — G89.29 CHRONIC PAIN OF RIGHT KNEE: Primary | ICD-10-CM

## 2024-04-09 DIAGNOSIS — Z74.09 IMPAIRED MOBILITY AND ADLS: ICD-10-CM

## 2024-04-09 DIAGNOSIS — M62.81 MUSCLE WEAKNESS: ICD-10-CM

## 2024-04-09 PROCEDURE — 97016 VASOPNEUMATIC DEVICE THERAPY: CPT | Performed by: PHYSICAL THERAPIST

## 2024-04-09 PROCEDURE — 97110 THERAPEUTIC EXERCISES: CPT | Performed by: PHYSICAL THERAPIST

## 2024-04-11 ENCOUNTER — TREATMENT (OUTPATIENT)
Age: 79
End: 2024-04-11

## 2024-04-11 DIAGNOSIS — M25.661 JOINT STIFFNESS OF RIGHT LOWER LEG: ICD-10-CM

## 2024-04-11 DIAGNOSIS — R26.2 DIFFICULTY WALKING: ICD-10-CM

## 2024-04-11 DIAGNOSIS — M62.81 MUSCLE WEAKNESS: ICD-10-CM

## 2024-04-11 DIAGNOSIS — M25.561 CHRONIC PAIN OF RIGHT KNEE: Primary | ICD-10-CM

## 2024-04-11 DIAGNOSIS — Z78.9 IMPAIRED MOBILITY AND ADLS: ICD-10-CM

## 2024-04-11 DIAGNOSIS — M79.89 RIGHT LEG SWELLING: ICD-10-CM

## 2024-04-11 DIAGNOSIS — G89.29 CHRONIC PAIN OF RIGHT KNEE: Primary | ICD-10-CM

## 2024-04-11 DIAGNOSIS — Z74.09 IMPAIRED MOBILITY AND ADLS: ICD-10-CM

## 2024-04-11 NOTE — PROGRESS NOTES
with community ambulation  Patient will be able to ambulate safely for community distances without assistive device on all surfaces to become independent with safe community ambulation  Resume normal activities/ADLs as discussed in evaluation (spin class at gym for exercise)  Improve LEFS to >/= 30% functional deficit  Discharged from PT      [facility]  [id number]

## 2024-04-16 ENCOUNTER — TREATMENT (OUTPATIENT)
Age: 79
End: 2024-04-16
Payer: MEDICARE

## 2024-04-16 DIAGNOSIS — M25.661 JOINT STIFFNESS OF RIGHT LOWER LEG: ICD-10-CM

## 2024-04-16 DIAGNOSIS — Z74.09 IMPAIRED MOBILITY AND ADLS: ICD-10-CM

## 2024-04-16 DIAGNOSIS — G89.29 CHRONIC PAIN OF RIGHT KNEE: Primary | ICD-10-CM

## 2024-04-16 DIAGNOSIS — M62.81 MUSCLE WEAKNESS: ICD-10-CM

## 2024-04-16 DIAGNOSIS — M79.89 RIGHT LEG SWELLING: ICD-10-CM

## 2024-04-16 DIAGNOSIS — M25.561 CHRONIC PAIN OF RIGHT KNEE: Primary | ICD-10-CM

## 2024-04-16 DIAGNOSIS — Z78.9 IMPAIRED MOBILITY AND ADLS: ICD-10-CM

## 2024-04-16 DIAGNOSIS — R26.2 DIFFICULTY WALKING: ICD-10-CM

## 2024-04-16 PROCEDURE — 97016 VASOPNEUMATIC DEVICE THERAPY: CPT | Performed by: PHYSICAL THERAPIST

## 2024-04-16 PROCEDURE — 97110 THERAPEUTIC EXERCISES: CPT | Performed by: PHYSICAL THERAPIST

## 2024-04-16 NOTE — PROGRESS NOTES
[facility] PT [facility] ORTHOPAED[facility]  [address] [place] [address]  Dept: [phone]            Physical Therapy Daily Note       Insurance: today is 12/20 visits ()     Total Timed Codes: 45 min, Total Treatment Time: 60 min Modifier needed: No    Referring MD: Shaq Segovia Jr., *  Surgery: Right TKA  Surgery Date: 2/14/2024      Diagnosis:     ICD-10-CM    1. Chronic pain of right knee  M25.561     G89.29       2. Muscle weakness  M62.81       3. Joint stiffness of right lower leg  M25.661       4. Impaired mobility and ADLs  Z74.09     Z78.9       5. Difficulty walking  R26.2       6. Right leg swelling  M79.89              Therapy precautions:None  Co-morbidities affecting plan of care: Bradycardia, HX of prostate CA.      SUBJECTIVE     Patient reports no pain today. He states he did not do stretching for flexion prior to therapy today. He did do stretching for extension at home this morning. He states he will concentrate on knee flexion at home as well as extension.    Medications: no changes since last session  Patient is compliant with HEP    OBJECTIVE     Treatment provided today:  Therapeutic exercise (13843) x 45 min to develop ROM, strength, endurance and flexibility.  Included:  Stationary bike (seat with 4 holes showing) L4 for 6 minutes     HS stretch in chair 1 minute     Step stretch for knee flexion 20x     Standing calf stretch 1 minute     Leg press 140# 30x (seat with 6 holes showing)  Single LP 70# 30x   HS 65# 30x  Knee extension 40# 30x     Step over 9\" step 20x  Standing hip 3 way with teal loop 30x           Followed by Vasopneumatic Compression (50495) with cold x 15 minutes: to right knee in order to reduce inflammation and swelling/joint effusion which will help improve ROM and manage pain.        Patient Education on the condition/pathology, involved anatomy, and exercise rationale. .      A/PROM Measures: 4/15/2024       Right Left Comment   Knee Flexion 120 122  After

## 2024-04-17 NOTE — PROGRESS NOTES
[facility] PT [facility] ORTHOPAED[facility]  [address] [place] [address]  Dept: [phone]            Physical Therapy Daily Note       Insurance: today is 13/20 visits ()     Total Timed Codes: 45 min, Total Treatment Time: 45 min Modifier needed: No    Referring MD: Shaq Segovia Jr., *  Surgery: Right TKA  Surgery Date: 2/14/2024      Diagnosis:     ICD-10-CM    1. Chronic pain of right knee  M25.561     G89.29       2. Muscle weakness  M62.81       3. Joint stiffness of right lower leg  M25.661       4. Impaired mobility and ADLs  Z74.09     Z78.9       5. Difficulty walking  R26.2       6. Right leg swelling  M79.89              Therapy precautions:None  Co-morbidities affecting plan of care: Bradycardia, HX of prostate CA.      SUBJECTIVE     Patient reports no pain today. He is elevating and icing right knee daily to reduce edema right knee and lower leg. He is doing stretching at home daily for improved right knee flexion and extension.     Medications: no changes since last session  Patient is compliant with HEP    OBJECTIVE     Treatment provided today:  Therapeutic exercise (16204) x 45 min to develop ROM, strength, endurance and flexibility.  Included:  Stationary bike (seat with 4 holes showing) L4 for 6 minutes     HS stretch in chair 1 minute     Step stretch for knee flexion 20x          Leg press 140# 30x (seat with 6 holes showing)  Single LP 70# 30x   HS 65# 30x  Knee extension 40# 30x     Step over 9\" step 20x (each leg)  Standing hip 3 way with teal loop 30x       Patient Education on the condition/pathology, involved anatomy, and exercise rationale. .      A/PROM Measures: 4/18/2024       Right Left Comment   Knee Flexion 120 122  After stretching   Knee Extension 0 0 R full extension after stretching                             ASSESSMENT     Patient has good AROM right knee flexion after stretching. He has normal gait He has mild edema right knee with minimal pitting edema lower

## 2024-04-18 ENCOUNTER — TREATMENT (OUTPATIENT)
Age: 79
End: 2024-04-18
Payer: MEDICARE

## 2024-04-18 DIAGNOSIS — Z78.9 IMPAIRED MOBILITY AND ADLS: ICD-10-CM

## 2024-04-18 DIAGNOSIS — Z74.09 IMPAIRED MOBILITY AND ADLS: ICD-10-CM

## 2024-04-18 DIAGNOSIS — M62.81 MUSCLE WEAKNESS: ICD-10-CM

## 2024-04-18 DIAGNOSIS — M25.561 CHRONIC PAIN OF RIGHT KNEE: Primary | ICD-10-CM

## 2024-04-18 DIAGNOSIS — M79.89 RIGHT LEG SWELLING: ICD-10-CM

## 2024-04-18 DIAGNOSIS — M25.661 JOINT STIFFNESS OF RIGHT LOWER LEG: ICD-10-CM

## 2024-04-18 DIAGNOSIS — R26.2 DIFFICULTY WALKING: ICD-10-CM

## 2024-04-18 DIAGNOSIS — G89.29 CHRONIC PAIN OF RIGHT KNEE: Primary | ICD-10-CM

## 2024-04-18 PROCEDURE — 97110 THERAPEUTIC EXERCISES: CPT | Performed by: PHYSICAL THERAPIST

## 2024-04-22 NOTE — PROGRESS NOTES
[facility] PT [facility] ORTHOPAEDICS  [address] [place] [address]  Dept: [phone]            Physical Therapy Daily Note       Insurance: today is 14/20 visits ()     Total Timed Codes: 45 min, Total Treatment Time: 60 min Modifier needed: No    Referring MD: Shaq Segovia Jr., *  Surgery: Right TKA  Surgery Date: 2/14/2024      Diagnosis:     ICD-10-CM    1. Chronic pain of right knee  M25.561     G89.29       2. Muscle weakness  M62.81       3. Joint stiffness of right lower leg  M25.661       4. Impaired mobility and ADLs  Z74.09     Z78.9       5. Difficulty walking  R26.2       6. Right leg swelling  M79.89              Therapy precautions:None  Co-morbidities affecting plan of care: Bradycardia, HX of prostate CA.      SUBJECTIVE     Patient reports no pain today. 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  He is doing stretching at home daily for improved right knee flexion and extension.     Medications: no changes since last session  Patient is compliant with HEP    OBJECTIVE     Treatment provided today:  Therapeutic exercise (37154) x 45 min to develop ROM, strength, endurance and flexibility.  Included:  Stationary bike (seat with 4 holes showing) L4 for 6 minutes     HS stretch in chair 1 minute     Step stretch for knee flexion 20x      Leg press 140# 30x (seat with 6 holes showing)  Single LP 70# 30x   HS 65# 30x  Knee extension 40# 30x     Step over 9\" step 20x (each leg)  Standing hip 3 way with teal loop 30x       Patient Education on the condition/pathology, involved anatomy, and exercise rationale. .    Followed by Vasopneumatic Compression (05234) with cold x 15 minutes: to right knee in order to reduce inflammation and swelling/joint effusion which will help improve ROM and manage pain.        A/PROM Measures: 4/18/2024       Right Left Comment   Knee Flexion 120 122  After stretching   Knee Extension 0 0 R full extension after stretching

## 2024-04-23 ENCOUNTER — TREATMENT (OUTPATIENT)
Age: 79
End: 2024-04-23
Payer: MEDICARE

## 2024-04-23 DIAGNOSIS — G89.29 CHRONIC PAIN OF RIGHT KNEE: Primary | ICD-10-CM

## 2024-04-23 DIAGNOSIS — M79.89 RIGHT LEG SWELLING: ICD-10-CM

## 2024-04-23 DIAGNOSIS — M25.561 CHRONIC PAIN OF RIGHT KNEE: Primary | ICD-10-CM

## 2024-04-23 DIAGNOSIS — Z74.09 IMPAIRED MOBILITY AND ADLS: ICD-10-CM

## 2024-04-23 DIAGNOSIS — Z78.9 IMPAIRED MOBILITY AND ADLS: ICD-10-CM

## 2024-04-23 DIAGNOSIS — M25.661 JOINT STIFFNESS OF RIGHT LOWER LEG: ICD-10-CM

## 2024-04-23 DIAGNOSIS — R26.2 DIFFICULTY WALKING: ICD-10-CM

## 2024-04-23 DIAGNOSIS — M62.81 MUSCLE WEAKNESS: ICD-10-CM

## 2024-04-23 PROCEDURE — 97110 THERAPEUTIC EXERCISES: CPT | Performed by: PHYSICAL THERAPIST

## 2024-04-23 PROCEDURE — 97016 VASOPNEUMATIC DEVICE THERAPY: CPT | Performed by: PHYSICAL THERAPIST

## 2024-04-24 NOTE — PROGRESS NOTES
deviation to become independent with community ambulation  Patient will be able to ambulate safely for community distances without assistive device on all surfaces to become independent with safe community ambulation  Resume normal activities/ADLs as discussed in evaluation (spin class at gym for exercise)  Improve LEFS to >/= 30% functional deficit  Discharged from PT      [facility]  [id number]

## 2024-04-25 ENCOUNTER — TREATMENT (OUTPATIENT)
Age: 79
End: 2024-04-25
Payer: MEDICARE

## 2024-04-25 DIAGNOSIS — R26.2 DIFFICULTY WALKING: ICD-10-CM

## 2024-04-25 DIAGNOSIS — M79.89 RIGHT LEG SWELLING: ICD-10-CM

## 2024-04-25 DIAGNOSIS — M25.561 CHRONIC PAIN OF RIGHT KNEE: Primary | ICD-10-CM

## 2024-04-25 DIAGNOSIS — G89.29 CHRONIC PAIN OF RIGHT KNEE: Primary | ICD-10-CM

## 2024-04-25 DIAGNOSIS — Z74.09 IMPAIRED MOBILITY AND ADLS: ICD-10-CM

## 2024-04-25 DIAGNOSIS — M62.81 MUSCLE WEAKNESS: ICD-10-CM

## 2024-04-25 DIAGNOSIS — Z78.9 IMPAIRED MOBILITY AND ADLS: ICD-10-CM

## 2024-04-25 DIAGNOSIS — M25.661 JOINT STIFFNESS OF RIGHT LOWER LEG: ICD-10-CM

## 2024-04-25 PROCEDURE — 97110 THERAPEUTIC EXERCISES: CPT | Performed by: PHYSICAL THERAPIST

## 2024-04-25 PROCEDURE — 97016 VASOPNEUMATIC DEVICE THERAPY: CPT | Performed by: PHYSICAL THERAPIST

## 2024-05-13 NOTE — PROGRESS NOTES
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

## 2024-05-14 ENCOUNTER — TREATMENT (OUTPATIENT)
Age: 79
End: 2024-05-14
Payer: MEDICARE

## 2024-05-14 DIAGNOSIS — M25.561 CHRONIC PAIN OF RIGHT KNEE: Primary | ICD-10-CM

## 2024-05-14 DIAGNOSIS — Z78.9 IMPAIRED MOBILITY AND ADLS: ICD-10-CM

## 2024-05-14 DIAGNOSIS — M62.81 MUSCLE WEAKNESS: ICD-10-CM

## 2024-05-14 DIAGNOSIS — M25.661 JOINT STIFFNESS OF RIGHT LOWER LEG: ICD-10-CM

## 2024-05-14 DIAGNOSIS — M79.89 RIGHT LEG SWELLING: ICD-10-CM

## 2024-05-14 DIAGNOSIS — Z74.09 IMPAIRED MOBILITY AND ADLS: ICD-10-CM

## 2024-05-14 DIAGNOSIS — G89.29 CHRONIC PAIN OF RIGHT KNEE: Primary | ICD-10-CM

## 2024-05-14 DIAGNOSIS — R26.2 DIFFICULTY WALKING: ICD-10-CM

## 2024-05-14 PROCEDURE — 97016 VASOPNEUMATIC DEVICE THERAPY: CPT | Performed by: PHYSICAL THERAPIST

## 2024-05-14 PROCEDURE — 97110 THERAPEUTIC EXERCISES: CPT | Performed by: PHYSICAL THERAPIST

## 2024-05-24 NOTE — PROGRESS NOTES
[facility]  [facility] [address]  Dept: [phone]            Physical Therapy Discharge       Insurance: today is 17/20 visits ()     Total Timed Codes: 40 min, Total Treatment Time: 55  min Modifier needed: No    Referring MD: Shaq Segovia Jr., *  Surgery: Right TKA  Surgery Date: 2/14/2024      Diagnosis:     ICD-10-CM    1. Chronic pain of right knee  M25.561     G89.29       2. Muscle weakness  M62.81       3. Joint stiffness of right lower leg  M25.661       4. Impaired mobility and ADLs  Z74.09     Z78.9       5. Difficulty walking  R26.2       6. Right leg swelling  M79.89              Therapy precautions:None  Co-morbidities affecting plan of care: Bradycardia, HX of prostate CA.      SUBJECTIVE     Patient reports no pain today. He states he has been having difficulty reciprocating stairs descending in community but yesterday at [facility], he was able to descend the stairs without difficulty. He reports difficulty with sit to stand from low seat. He is compliant with HEP and gym program.      Medications: no changes since last session  Patient is compliant with HEP    OBJECTIVE     Treatment provided today:  Therapeutic exercise (32552) x 40 min to develop ROM, strength, endurance and flexibility.  Included:  Stationary bike (seat with 4 holes showing) L4 for 6 minutes     HS stretch in chair 1 minute     Step stretch for knee flexion 20x      Leg press 145# 30x (seat with 6 holes showing)  Single LP 70# 30x   HS 65# 30x  Knee extension 40# 30x     Step over 9\" step 10x/12\" step 15x   squat over 20\" box 10x  Sit to stand from 18\" seat 20x  Squats at barre 20x        Followed by Vasopneumatic Compression (13679) with cold x 15 minutes: to right knee in order to reduce inflammation and swelling/joint effusion which will help improve ROM and manage pain.        A/PROM Measures:        Right Left Comment   Knee Flexion 123A 122  After stretching   Knee Extension  Patient called and scheduled next available appt on 11/3/22 - requesting a refill to get her through until the appt  Pt is going for blood work this week  Please advise

## 2024-05-28 ENCOUNTER — TREATMENT (OUTPATIENT)
Age: 79
End: 2024-05-28
Payer: MEDICARE

## 2024-05-28 DIAGNOSIS — M62.81 MUSCLE WEAKNESS: ICD-10-CM

## 2024-05-28 DIAGNOSIS — M79.89 RIGHT LEG SWELLING: ICD-10-CM

## 2024-05-28 DIAGNOSIS — M25.661 JOINT STIFFNESS OF RIGHT LOWER LEG: ICD-10-CM

## 2024-05-28 DIAGNOSIS — G89.29 CHRONIC PAIN OF RIGHT KNEE: Primary | ICD-10-CM

## 2024-05-28 DIAGNOSIS — Z74.09 IMPAIRED MOBILITY AND ADLS: ICD-10-CM

## 2024-05-28 DIAGNOSIS — Z78.9 IMPAIRED MOBILITY AND ADLS: ICD-10-CM

## 2024-05-28 DIAGNOSIS — M25.561 CHRONIC PAIN OF RIGHT KNEE: Primary | ICD-10-CM

## 2024-05-28 DIAGNOSIS — R26.2 DIFFICULTY WALKING: ICD-10-CM

## 2024-05-28 PROCEDURE — 97110 THERAPEUTIC EXERCISES: CPT | Performed by: PHYSICAL THERAPIST

## 2024-05-28 PROCEDURE — 97016 VASOPNEUMATIC DEVICE THERAPY: CPT | Performed by: PHYSICAL THERAPIST

## 2024-07-18 ENCOUNTER — OFFICE VISIT (OUTPATIENT)
Dept: ORTHOPEDIC SURGERY | Age: 79
End: 2024-07-18
Payer: MEDICARE

## 2024-07-18 DIAGNOSIS — Z96.651 STATUS POST TOTAL RIGHT KNEE REPLACEMENT: Primary | ICD-10-CM

## 2024-07-18 PROCEDURE — G8428 CUR MEDS NOT DOCUMENT: HCPCS | Performed by: ORTHOPAEDIC SURGERY

## 2024-07-18 PROCEDURE — 1036F TOBACCO NON-USER: CPT | Performed by: ORTHOPAEDIC SURGERY

## 2024-07-18 PROCEDURE — 1123F ACP DISCUSS/DSCN MKR DOCD: CPT | Performed by: ORTHOPAEDIC SURGERY

## 2024-07-18 PROCEDURE — 99213 OFFICE O/P EST LOW 20 MIN: CPT | Performed by: ORTHOPAEDIC SURGERY

## 2024-07-18 PROCEDURE — G8419 CALC BMI OUT NRM PARAM NOF/U: HCPCS | Performed by: ORTHOPAEDIC SURGERY

## 2024-07-18 NOTE — PROGRESS NOTES
Post-op TKA Visit      07/18/24     Allergies   Allergen Reactions    Morphine Itching    Oxycodone Other (See Comments)     hallucinations    Aluminum-Containing Compounds Rash     Current Outpatient Medications on File Prior to Visit   Medication Sig Dispense Refill    aspirin (ASPIRIN 81) 81 MG EC tablet Take 1 tablet by mouth in the morning and at bedtime 70 tablet 0    celecoxib (CELEBREX) 200 MG capsule Take 1 capsule by mouth 2 times daily 60 capsule 0    promethazine (PHENERGAN) 12.5 MG tablet Take 1 tablet by mouth 4 times daily as needed for Nausea 20 tablet 2    tiZANidine (ZANAFLEX) 2 MG tablet Take 1 tablet by mouth every 6 hours as needed (muscle spasm) 30 tablet 0    montelukast (SINGULAIR) 10 MG tablet Take 1 tablet by mouth daily      olmesartan (BENICAR) 5 MG tablet Take 1 tablet by mouth daily      tadalafil (CIALIS) 5 MG tablet Take 5 mg by mouth daily       No current facility-administered medications on file prior to visit.        6 months Status Post right TKA     History: The patient returns today for post-op visit following right TKA.   Their pain is improving. They are ambulating without any walking aid. They have completed physical therapy and resumed most of their normal activities.   They are pleased with their results thus far. Denies any new complaints today.     Physical Exam:  The patient's incision is well healed. There is minimal swelling and minimal increased warmth. ROM is good. There is no M/L or A/P instability. The calf is soft and non-tender. Neurologic and vascular exams are intact.    X-Rays: AP, Lateral, and sunrise views of the right knee reveals a cementless TKA, [place] prosthesis. 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 There is good alignment and good position of the components.    X-Ray Diagnosis: Satisfactory appearance right TKA    Disposition: The patient is doing well following right TKA. They will continue physical therapy exercises and normal activity as tolerated. The

## (undated) DEVICE — STERILE PRESSURE PROTECTOR PAD® FOR DE MAYO UNIVERSAL DISTRACTOR® (10/CASE): Brand: DE MAYO UNIVERSAL DISTRACTOR®

## (undated) DEVICE — BLADE RMR L51MM PAT PILOT H

## (undated) DEVICE — BLADE SURG SAW STD S STL OSC W/ SERR EDGE DISP

## (undated) DEVICE — SUTURE STRATAFIX SPRL MCRYL + SZ 2-0 L18IN ABSRB UD CT-1 SXMP1B413

## (undated) DEVICE — SUTURE VCRL SZ 1 L36IN ABSRB UD L36MM CT-1 1/2 CIR J947H

## (undated) DEVICE — CURETTE SURG FOR BNE CEM REM QUIK USE

## (undated) DEVICE — PIN BNE FIX TEMP L110MM DIA4MM MAKO

## (undated) DEVICE — PIN BNE FIX TEMP L140MM DIA4MM MAKO

## (undated) DEVICE — BIPOLAR SEALER 23-112-1 AQM 6.0: Brand: AQUAMANTYS ®

## (undated) DEVICE — KIT TRK KNEE PROC VIZADISC

## (undated) DEVICE — 450 ML BOTTLE OF 0.05% CHLORHEXIDINE GLUCONATE IN 99.95% STERILE WATER FOR IRRIGATION, USP AND APPLICATOR.: Brand: IRRISEPT ANTIMICROBIAL WOUND LAVAGE

## (undated) DEVICE — SOLUTION IV 250ML 0.9% SOD CHL PH 5 INJ USP VIAFLX PLAS

## (undated) DEVICE — SOLUTION IRRIG 1000ML STRL H2O USP PLAS POUR BTL

## (undated) DEVICE — SUTURE ABS ANTIBACT 1-0 CTX 24IN STRATAFIX PDS+ SXPP1A445

## (undated) DEVICE — SOLUTION IRRIG 1000ML 0.9% SOD CHL USP POUR PLAS BTL

## (undated) DEVICE — KIT DRP FOR RIO ROBOTIC ARM ASST SYS

## (undated) DEVICE — SOLUTION IRRIG 3000ML 0.9% SOD CHL USP UROMATIC PLAS CONT

## (undated) DEVICE — YANKAUER,FLEXIBLE HANDLE,REGLR CAPACITY: Brand: MEDLINE INDUSTRIES, INC.

## (undated) DEVICE — GLOVE ORANGE PI 7   MSG9070

## (undated) DEVICE — DRAPE,TOP,102X53,STERILE: Brand: MEDLINE

## (undated) DEVICE — GLOVE SURG SZ 75 L12IN FNGR THK79MIL GRN LTX FREE

## (undated) DEVICE — DRESSING HYDROFIBER AQUACEL AG ADVANTAGE 3.5X12 IN

## (undated) DEVICE — GLOVE ORANGE PI 8   MSG9080

## (undated) DEVICE — GLOVE SURG SZ 7 L11.33IN FNGR THK9.8MIL STRW LTX POLYMER

## (undated) DEVICE — TOTAL KNEE DR JENNINGS: Brand: MEDLINE INDUSTRIES, INC.

## (undated) DEVICE — SYRINGE MED 50ML LUERLOCK TIP

## (undated) DEVICE — GLOVE SURG SZ 8 L12IN FNGR THK79MIL GRN LTX FREE

## (undated) DEVICE — SUTURE ETHBND EXCEL SZ 2 L30IN NONABSORBABLE GRN L75MM LR X496T

## (undated) DEVICE — GLOVE SURG SZ 65 THK91MIL LTX FREE SYN POLYISOPRENE

## (undated) DEVICE — KIT INT FIX FEM TIB CKPT MAKOPLASTY

## (undated) DEVICE — STERILE PVP: Brand: MEDLINE INDUSTRIES, INC.